# Patient Record
Sex: FEMALE | Race: WHITE | Employment: OTHER | URBAN - METROPOLITAN AREA
[De-identification: names, ages, dates, MRNs, and addresses within clinical notes are randomized per-mention and may not be internally consistent; named-entity substitution may affect disease eponyms.]

---

## 2017-05-12 ENCOUNTER — HOSPITAL ENCOUNTER (OUTPATIENT)
Dept: MAMMOGRAPHY | Age: 67
Discharge: HOME | End: 2017-05-12
Attending: FAMILY MEDICINE | Admitting: FAMILY MEDICINE

## 2017-05-12 DIAGNOSIS — Z12.31 ENCOUNTER FOR SCREENING MAMMOGRAM FOR BREAST CANCER: ICD-10-CM

## 2018-11-13 ENCOUNTER — HOSPITAL ENCOUNTER (OUTPATIENT)
Dept: WOMENS IMAGING | Age: 68
Discharge: HOME | End: 2018-11-13
Payer: COMMERCIAL

## 2018-11-13 DIAGNOSIS — Z12.31 ENCOUNTER FOR SCREENING MAMMOGRAM FOR BREAST CANCER: ICD-10-CM

## 2018-11-13 PROCEDURE — 77067 SCR MAMMO BI INCL CAD: CPT

## 2019-01-10 ENCOUNTER — ANESTHESIA EVENT (OUTPATIENT)
Dept: ENDOSCOPY | Age: 69
End: 2019-01-10
Payer: COMMERCIAL

## 2019-01-23 ENCOUNTER — ANESTHESIA (OUTPATIENT)
Dept: ENDOSCOPY | Age: 69
End: 2019-01-23
Payer: COMMERCIAL

## 2019-01-23 ENCOUNTER — HOSPITAL ENCOUNTER (OUTPATIENT)
Age: 69
Setting detail: OUTPATIENT SURGERY
Discharge: HOME | End: 2019-01-23
Attending: INTERNAL MEDICINE | Admitting: INTERNAL MEDICINE
Payer: COMMERCIAL

## 2019-01-23 VITALS
HEIGHT: 64 IN | DIASTOLIC BLOOD PRESSURE: 74 MMHG | BODY MASS INDEX: 20.83 KG/M2 | OXYGEN SATURATION: 100 % | HEART RATE: 58 BPM | TEMPERATURE: 97.3 F | WEIGHT: 122 LBS | RESPIRATION RATE: 18 BRPM | SYSTOLIC BLOOD PRESSURE: 160 MMHG

## 2019-01-23 VITALS
RESPIRATION RATE: 16 BRPM | OXYGEN SATURATION: 99 % | DIASTOLIC BLOOD PRESSURE: 58 MMHG | SYSTOLIC BLOOD PRESSURE: 105 MMHG

## 2019-01-23 PROCEDURE — 3700000001 HC ADD 15 MINUTES (ANESTHESIA): Performed by: INTERNAL MEDICINE

## 2019-01-23 PROCEDURE — 6360000002 HC RX W HCPCS: Performed by: NURSE ANESTHETIST, CERTIFIED REGISTERED

## 2019-01-23 PROCEDURE — 3609010600 HC COLONOSCOPY POLYPECTOMY SNARE/COLD BIOPSY: Performed by: INTERNAL MEDICINE

## 2019-01-23 PROCEDURE — 7100000011 HC PHASE II RECOVERY - ADDTL 15 MIN: Performed by: INTERNAL MEDICINE

## 2019-01-23 PROCEDURE — 3609012400 HC EGD TRANSORAL BIOPSY SINGLE/MULTIPLE: Performed by: INTERNAL MEDICINE

## 2019-01-23 PROCEDURE — 3700000000 HC ANESTHESIA ATTENDED CARE: Performed by: INTERNAL MEDICINE

## 2019-01-23 PROCEDURE — 88305 TISSUE EXAM BY PATHOLOGIST: CPT

## 2019-01-23 PROCEDURE — C1726 CATH, BAL DIL, NON-VASCULAR: HCPCS | Performed by: INTERNAL MEDICINE

## 2019-01-23 PROCEDURE — 3609012500 HC EGD DILATION BALLOON: Performed by: INTERNAL MEDICINE

## 2019-01-23 PROCEDURE — 2580000003 HC RX 258: Performed by: ANESTHESIOLOGY

## 2019-01-23 PROCEDURE — 2500000003 HC RX 250 WO HCPCS: Performed by: NURSE ANESTHETIST, CERTIFIED REGISTERED

## 2019-01-23 PROCEDURE — 88342 IMHCHEM/IMCYTCHM 1ST ANTB: CPT

## 2019-01-23 PROCEDURE — 7100000010 HC PHASE II RECOVERY - FIRST 15 MIN: Performed by: INTERNAL MEDICINE

## 2019-01-23 PROCEDURE — 2709999900 HC NON-CHARGEABLE SUPPLY: Performed by: INTERNAL MEDICINE

## 2019-01-23 RX ORDER — LIDOCAINE HYDROCHLORIDE 20 MG/ML
INJECTION, SOLUTION INFILTRATION; PERINEURAL PRN
Status: DISCONTINUED | OUTPATIENT
Start: 2019-01-23 | End: 2019-01-23 | Stop reason: SDUPTHER

## 2019-01-23 RX ORDER — PROPOFOL 10 MG/ML
INJECTION, EMULSION INTRAVENOUS PRN
Status: DISCONTINUED | OUTPATIENT
Start: 2019-01-23 | End: 2019-01-23 | Stop reason: SDUPTHER

## 2019-01-23 RX ORDER — SODIUM CHLORIDE 9 MG/ML
INJECTION, SOLUTION INTRAVENOUS CONTINUOUS
Status: DISCONTINUED | OUTPATIENT
Start: 2019-01-23 | End: 2019-01-23 | Stop reason: HOSPADM

## 2019-01-23 RX ORDER — LIDOCAINE HYDROCHLORIDE 10 MG/ML
1 INJECTION, SOLUTION EPIDURAL; INFILTRATION; INTRACAUDAL; PERINEURAL
Status: DISCONTINUED | OUTPATIENT
Start: 2019-01-23 | End: 2019-01-23 | Stop reason: HOSPADM

## 2019-01-23 RX ADMIN — PROPOFOL 20 MG: 10 INJECTION, EMULSION INTRAVENOUS at 09:52

## 2019-01-23 RX ADMIN — SODIUM CHLORIDE: 9 INJECTION, SOLUTION INTRAVENOUS at 09:30

## 2019-01-23 RX ADMIN — LIDOCAINE HYDROCHLORIDE 80 MG: 20 INJECTION, SOLUTION INFILTRATION; PERINEURAL at 09:45

## 2019-01-23 RX ADMIN — PROPOFOL 80 MG: 10 INJECTION, EMULSION INTRAVENOUS at 09:45

## 2019-01-23 RX ADMIN — PROPOFOL 20 MG: 10 INJECTION, EMULSION INTRAVENOUS at 09:50

## 2019-01-23 RX ADMIN — PROPOFOL 20 MG: 10 INJECTION, EMULSION INTRAVENOUS at 09:48

## 2019-01-23 ASSESSMENT — PAIN SCALES - GENERAL
PAINLEVEL_OUTOF10: 0

## 2019-01-23 ASSESSMENT — PAIN - FUNCTIONAL ASSESSMENT: PAIN_FUNCTIONAL_ASSESSMENT: 0-10

## 2019-01-24 LAB
GLUCOSE BLD-MCNC: 107 MG/DL (ref 70–99)
GLUCOSE BLD-MCNC: 93 MG/DL (ref 70–99)
PERFORMED ON: ABNORMAL
PERFORMED ON: NORMAL

## 2019-11-30 ENCOUNTER — ANESTHESIA EVENT (OUTPATIENT)
Dept: ENDOSCOPY | Age: 69
End: 2019-11-30
Payer: COMMERCIAL

## 2019-12-11 ENCOUNTER — HOSPITAL ENCOUNTER (OUTPATIENT)
Age: 69
Setting detail: OUTPATIENT SURGERY
Discharge: HOME | End: 2019-12-11
Attending: INTERNAL MEDICINE | Admitting: INTERNAL MEDICINE
Payer: COMMERCIAL

## 2019-12-11 ENCOUNTER — ANESTHESIA (OUTPATIENT)
Dept: ENDOSCOPY | Age: 69
End: 2019-12-11
Payer: COMMERCIAL

## 2019-12-11 VITALS
SYSTOLIC BLOOD PRESSURE: 135 MMHG | RESPIRATION RATE: 14 BRPM | TEMPERATURE: 97.6 F | OXYGEN SATURATION: 100 % | HEART RATE: 60 BPM | BODY MASS INDEX: 21 KG/M2 | DIASTOLIC BLOOD PRESSURE: 72 MMHG | HEIGHT: 64 IN | WEIGHT: 123 LBS

## 2019-12-11 VITALS — OXYGEN SATURATION: 100 % | DIASTOLIC BLOOD PRESSURE: 53 MMHG | SYSTOLIC BLOOD PRESSURE: 95 MMHG

## 2019-12-11 LAB
GLUCOSE BLD-MCNC: 102 MG/DL (ref 70–99)
GLUCOSE BLD-MCNC: 106 MG/DL (ref 70–99)
PERFORMED ON: ABNORMAL
PERFORMED ON: ABNORMAL

## 2019-12-11 PROCEDURE — 2500000003 HC RX 250 WO HCPCS: Performed by: NURSE ANESTHETIST, CERTIFIED REGISTERED

## 2019-12-11 PROCEDURE — 7100000011 HC PHASE II RECOVERY - ADDTL 15 MIN: Performed by: INTERNAL MEDICINE

## 2019-12-11 PROCEDURE — 2709999900 HC NON-CHARGEABLE SUPPLY: Performed by: INTERNAL MEDICINE

## 2019-12-11 PROCEDURE — 7100000010 HC PHASE II RECOVERY - FIRST 15 MIN: Performed by: INTERNAL MEDICINE

## 2019-12-11 PROCEDURE — 88305 TISSUE EXAM BY PATHOLOGIST: CPT

## 2019-12-11 PROCEDURE — 3700000000 HC ANESTHESIA ATTENDED CARE: Performed by: INTERNAL MEDICINE

## 2019-12-11 PROCEDURE — 3700000001 HC ADD 15 MINUTES (ANESTHESIA): Performed by: INTERNAL MEDICINE

## 2019-12-11 PROCEDURE — 3609010600 HC COLONOSCOPY POLYPECTOMY SNARE/COLD BIOPSY: Performed by: INTERNAL MEDICINE

## 2019-12-11 PROCEDURE — 6360000002 HC RX W HCPCS: Performed by: NURSE ANESTHETIST, CERTIFIED REGISTERED

## 2019-12-11 PROCEDURE — 2580000003 HC RX 258: Performed by: ANESTHESIOLOGY

## 2019-12-11 RX ORDER — LANOLIN ALCOHOL/MO/W.PET/CERES
325 CREAM (GRAM) TOPICAL
COMMUNITY
End: 2020-09-28 | Stop reason: ALTCHOICE

## 2019-12-11 RX ORDER — SODIUM CHLORIDE 0.9 % (FLUSH) 0.9 %
10 SYRINGE (ML) INJECTION EVERY 12 HOURS SCHEDULED
Status: DISCONTINUED | OUTPATIENT
Start: 2019-12-11 | End: 2019-12-11 | Stop reason: HOSPADM

## 2019-12-11 RX ORDER — GLYCOPYRROLATE 0.2 MG/ML
INJECTION INTRAMUSCULAR; INTRAVENOUS PRN
Status: DISCONTINUED | OUTPATIENT
Start: 2019-12-11 | End: 2019-12-11 | Stop reason: SDUPTHER

## 2019-12-11 RX ORDER — LIDOCAINE HYDROCHLORIDE 20 MG/ML
INJECTION, SOLUTION INFILTRATION; PERINEURAL PRN
Status: DISCONTINUED | OUTPATIENT
Start: 2019-12-11 | End: 2019-12-11 | Stop reason: SDUPTHER

## 2019-12-11 RX ORDER — PROPOFOL 10 MG/ML
INJECTION, EMULSION INTRAVENOUS PRN
Status: DISCONTINUED | OUTPATIENT
Start: 2019-12-11 | End: 2019-12-11 | Stop reason: SDUPTHER

## 2019-12-11 RX ORDER — PROPOFOL 10 MG/ML
INJECTION, EMULSION INTRAVENOUS CONTINUOUS PRN
Status: DISCONTINUED | OUTPATIENT
Start: 2019-12-11 | End: 2019-12-11 | Stop reason: SDUPTHER

## 2019-12-11 RX ORDER — SODIUM CHLORIDE 0.9 % (FLUSH) 0.9 %
10 SYRINGE (ML) INJECTION PRN
Status: DISCONTINUED | OUTPATIENT
Start: 2019-12-11 | End: 2019-12-11 | Stop reason: HOSPADM

## 2019-12-11 RX ORDER — SODIUM CHLORIDE 9 MG/ML
INJECTION, SOLUTION INTRAVENOUS CONTINUOUS
Status: DISCONTINUED | OUTPATIENT
Start: 2019-12-11 | End: 2019-12-11 | Stop reason: HOSPADM

## 2019-12-11 RX ADMIN — LIDOCAINE HYDROCHLORIDE 100 MG: 20 INJECTION, SOLUTION INFILTRATION; PERINEURAL at 08:23

## 2019-12-11 RX ADMIN — GLYCOPYRROLATE 0.2 MG: 0.2 INJECTION INTRAMUSCULAR; INTRAVENOUS at 08:31

## 2019-12-11 RX ADMIN — PROPOFOL 30 MG: 10 INJECTION, EMULSION INTRAVENOUS at 08:27

## 2019-12-11 RX ADMIN — GLYCOPYRROLATE 0.2 MG: 0.2 INJECTION INTRAMUSCULAR; INTRAVENOUS at 08:21

## 2019-12-11 RX ADMIN — PROPOFOL 30 MG: 10 INJECTION, EMULSION INTRAVENOUS at 08:31

## 2019-12-11 RX ADMIN — PROPOFOL 50 MCG/KG/MIN: 10 INJECTION, EMULSION INTRAVENOUS at 08:23

## 2019-12-11 RX ADMIN — PROPOFOL 40 MG: 10 INJECTION, EMULSION INTRAVENOUS at 08:23

## 2019-12-11 RX ADMIN — SODIUM CHLORIDE: 9 INJECTION, SOLUTION INTRAVENOUS at 07:46

## 2019-12-11 ASSESSMENT — PAIN - FUNCTIONAL ASSESSMENT: PAIN_FUNCTIONAL_ASSESSMENT: 0-10

## 2019-12-11 ASSESSMENT — PAIN SCALES - GENERAL: PAINLEVEL_OUTOF10: 0

## 2020-02-03 NOTE — PROGRESS NOTES
Patient not reached. Preop instructions left on voice mail. Number__[phone]_______      DATE__2/5/2020______ TIME__0730______ARRIVAL_FEC  0600________      Nothing to eat or drink after midnight the night before,except for what the prep instructions call for. If you do not have the instructions or do not understand them please contact your doctors office. Follow any instructions your doctors office has given you including what medications to take the AM of your procedure and which ones to hold. You may use your inhalers. If you take a long acting insulin the michael prior please cut the dose in half and take no diabetic medications that AM.Follow specific doctors office instructions regarding blood thinners and if they want you to hold and for how long. If you are on a blood thinner and have no instructions please contact the office and ask. Dress comfortably,bring your insurance card,picture ID,and a complete list of medications, including supplements. You must have a responsible adult to stay with you during the procedure,drive you home and stay with you. [facility] phone number [phone] for any questions.

## 2020-02-04 ENCOUNTER — ANESTHESIA EVENT (OUTPATIENT)
Dept: ENDOSCOPY | Age: 70
End: 2020-02-04
Payer: COMMERCIAL

## 2020-02-05 ENCOUNTER — ANESTHESIA (OUTPATIENT)
Dept: ENDOSCOPY | Age: 70
End: 2020-02-05
Payer: COMMERCIAL

## 2020-02-05 ENCOUNTER — HOSPITAL ENCOUNTER (OUTPATIENT)
Age: 70
Setting detail: OUTPATIENT SURGERY
Discharge: HOME | End: 2020-02-05
Attending: INTERNAL MEDICINE | Admitting: INTERNAL MEDICINE
Payer: COMMERCIAL

## 2020-02-05 VITALS
BODY MASS INDEX: 21.17 KG/M2 | OXYGEN SATURATION: 100 % | HEART RATE: 63 BPM | WEIGHT: 124 LBS | SYSTOLIC BLOOD PRESSURE: 131 MMHG | DIASTOLIC BLOOD PRESSURE: 80 MMHG | TEMPERATURE: 97.2 F | RESPIRATION RATE: 18 BRPM | HEIGHT: 64 IN

## 2020-02-05 VITALS — SYSTOLIC BLOOD PRESSURE: 133 MMHG | DIASTOLIC BLOOD PRESSURE: 75 MMHG | OXYGEN SATURATION: 95 %

## 2020-02-05 LAB
GLUCOSE BLD-MCNC: 113 MG/DL (ref 70–99)
PERFORMED ON: ABNORMAL

## 2020-02-05 PROCEDURE — 7100000010 HC PHASE II RECOVERY - FIRST 15 MIN: Performed by: INTERNAL MEDICINE

## 2020-02-05 PROCEDURE — 2500000003 HC RX 250 WO HCPCS: Performed by: NURSE ANESTHETIST, CERTIFIED REGISTERED

## 2020-02-05 PROCEDURE — 3700000000 HC ANESTHESIA ATTENDED CARE: Performed by: INTERNAL MEDICINE

## 2020-02-05 PROCEDURE — 3609019000 HC EGD CAPSULE ENDOSCOPY: Performed by: INTERNAL MEDICINE

## 2020-02-05 PROCEDURE — 7100000000 HC PACU RECOVERY - FIRST 15 MIN: Performed by: INTERNAL MEDICINE

## 2020-02-05 PROCEDURE — 3700000001 HC ADD 15 MINUTES (ANESTHESIA): Performed by: INTERNAL MEDICINE

## 2020-02-05 PROCEDURE — C1726 CATH, BAL DIL, NON-VASCULAR: HCPCS | Performed by: INTERNAL MEDICINE

## 2020-02-05 PROCEDURE — 7100000011 HC PHASE II RECOVERY - ADDTL 15 MIN: Performed by: INTERNAL MEDICINE

## 2020-02-05 PROCEDURE — 2709999900 HC NON-CHARGEABLE SUPPLY: Performed by: INTERNAL MEDICINE

## 2020-02-05 PROCEDURE — 3609017700 HC EGD DILATION GASTRIC/DUODENAL STRICTURE: Performed by: INTERNAL MEDICINE

## 2020-02-05 PROCEDURE — 2580000003 HC RX 258: Performed by: ANESTHESIOLOGY

## 2020-02-05 PROCEDURE — 3609012400 HC EGD TRANSORAL BIOPSY SINGLE/MULTIPLE: Performed by: INTERNAL MEDICINE

## 2020-02-05 PROCEDURE — 6360000002 HC RX W HCPCS: Performed by: NURSE ANESTHETIST, CERTIFIED REGISTERED

## 2020-02-05 PROCEDURE — 2720000010 HC SURG SUPPLY STERILE: Performed by: INTERNAL MEDICINE

## 2020-02-05 PROCEDURE — 7100000001 HC PACU RECOVERY - ADDTL 15 MIN: Performed by: INTERNAL MEDICINE

## 2020-02-05 PROCEDURE — 88305 TISSUE EXAM BY PATHOLOGIST: CPT

## 2020-02-05 RX ORDER — ATORVASTATIN CALCIUM 10 MG/1
10 TABLET, FILM COATED ORAL DAILY
COMMUNITY
End: 2020-09-28 | Stop reason: ALTCHOICE

## 2020-02-05 RX ORDER — PROPOFOL 10 MG/ML
INJECTION, EMULSION INTRAVENOUS PRN
Status: DISCONTINUED | OUTPATIENT
Start: 2020-02-05 | End: 2020-02-05 | Stop reason: SDUPTHER

## 2020-02-05 RX ORDER — SODIUM CHLORIDE 0.9 % (FLUSH) 0.9 %
10 SYRINGE (ML) INJECTION EVERY 12 HOURS SCHEDULED
Status: DISCONTINUED | OUTPATIENT
Start: 2020-02-05 | End: 2020-02-05 | Stop reason: HOSPADM

## 2020-02-05 RX ORDER — SODIUM CHLORIDE 9 MG/ML
INJECTION, SOLUTION INTRAVENOUS CONTINUOUS
Status: DISCONTINUED | OUTPATIENT
Start: 2020-02-05 | End: 2020-02-05 | Stop reason: HOSPADM

## 2020-02-05 RX ORDER — LIDOCAINE HYDROCHLORIDE 20 MG/ML
INJECTION, SOLUTION EPIDURAL; INFILTRATION; INTRACAUDAL; PERINEURAL PRN
Status: DISCONTINUED | OUTPATIENT
Start: 2020-02-05 | End: 2020-02-05 | Stop reason: SDUPTHER

## 2020-02-05 RX ORDER — SODIUM CHLORIDE 0.9 % (FLUSH) 0.9 %
10 SYRINGE (ML) INJECTION PRN
Status: DISCONTINUED | OUTPATIENT
Start: 2020-02-05 | End: 2020-02-05 | Stop reason: HOSPADM

## 2020-02-05 RX ADMIN — LIDOCAINE HYDROCHLORIDE 100 MG: 20 INJECTION, SOLUTION EPIDURAL; INFILTRATION; INTRACAUDAL; PERINEURAL at 07:38

## 2020-02-05 RX ADMIN — PROPOFOL 50 MG: 10 INJECTION, EMULSION INTRAVENOUS at 07:50

## 2020-02-05 RX ADMIN — PROPOFOL 50 MG: 10 INJECTION, EMULSION INTRAVENOUS at 07:44

## 2020-02-05 RX ADMIN — PROPOFOL 50 MG: 10 INJECTION, EMULSION INTRAVENOUS at 07:41

## 2020-02-05 RX ADMIN — PROPOFOL 100 MG: 10 INJECTION, EMULSION INTRAVENOUS at 07:38

## 2020-02-05 RX ADMIN — SODIUM CHLORIDE: 9 INJECTION, SOLUTION INTRAVENOUS at 06:15

## 2020-02-05 RX ADMIN — PROPOFOL 50 MG: 10 INJECTION, EMULSION INTRAVENOUS at 07:47

## 2020-02-05 ASSESSMENT — PAIN SCALES - GENERAL
PAINLEVEL_OUTOF10: 0

## 2020-02-05 ASSESSMENT — PAIN - FUNCTIONAL ASSESSMENT: PAIN_FUNCTIONAL_ASSESSMENT: 0-10

## 2020-02-05 NOTE — ANESTHESIA PRE PROCEDURE
Department of Anesthesiology  Preprocedure Note       Name:  Alejandro Reilly   Age:  77 y.o.  :  1950                                          MRN:  [id number]         Date:  2020      Surgeon: Susan Grady):  Iker Foster MD    Procedure: EGD WITH DILATION / CAPSULE LAUNCH (N/A Abdomen)    Medications prior to admission:   Prior to Admission medications    Medication Sig Start Date End Date Taking? Authorizing Provider   atorvastatin (LIPITOR) 10 MG tablet Take 10 mg by mouth daily    Historical Provider, MD   ferrous sulfate 325 (65 Fe) MG EC tablet Take 325 mg by mouth 3 times daily (with meals)    Historical Provider, MD   Cyanocobalamin (VITAMIN B 12 PO) Take 1,000 mcg by mouth daily    Historical Provider, MD   linaclotide (LINZESS) 290 MCG CAPS capsule Take 1 capsule by mouth every morning (before breakfast) 16   Shannan Jones MD   atenolol-chlorthalidone (TENORETIC 100) 100-25 MG per tablet Take 1 tablet by mouth daily 16  Shannan Jones MD   metFORMIN (GLUCOPHAGE) 500 MG tablet Take 1 tablet by mouth 2 times daily (with meals) 16  Shannan Jones MD   verapamil (CALAN) 120 MG tablet Take 1 tablet by mouth 2 times daily 16   Shannan Jones MD   Amoxicillin 500 MG TABS Take 1 tablet by mouth 3 times daily 16   Shannan Jones MD   lubiprostone (AMITIZA) 24 MCG capsule Take 1 capsule by mouth daily (with breakfast) 16   Shannan Jones MD       Current medications:    No current facility-administered medications for this visit. No current outpatient medications on file.      Facility-Administered Medications Ordered in Other Visits   Medication Dose Route Frequency Provider Last Rate Last Dose    0.9 % sodium chloride infusion   Intravenous Continuous Rai Taveras  mL/hr at 20 0615      sodium chloride flush 0.9 % injection 10 mL  10 mL Intravenous 2 times per day Rai Taveras MD        sodium chloride flush 0.9 % injection 10 mL  10 mL Intravenous PRN Jose Elias Dumont MD           Allergies:  No Known Allergies    Problem List:    Patient Active Problem List   Diagnosis Code    Essential hypertension, malignant I10    Type II or unspecified type diabetes mellitus without mention of complication, not stated as uncontrolled E11.9    Chronic idiopathic constipation K59.04       Past Medical History:        Diagnosis Date    Colon polyps     Constipation     Diabetes mellitus ([facility] [address]     Hyperlipidemia     Hypertension        Past Surgical History:        Procedure Laterality Date     SECTION      COLONOSCOPY  2019    COLONOSCOPY POLYPECTOMY SNARE/COLD BIOPSY performed by Erinn Santos MD at [address] [facility] N/A 2019    COLONOSCOPY POLYPECTOMY SNARE/COLD BIOPSY performed by Erinn Santos MD at [address] N/A 2019    EGD BIOPSY performed by Erinn Santos MD at [address] N/A 2019    EGD DILATION BALLOON performed by Erinn Santos MD at [address] History:    Social History     Tobacco Use    Smoking status: Never Smoker    Smokeless tobacco: Never Used   Substance Use Topics    Alcohol use: Yes     Alcohol/week: 18.0 standard drinks     Types: 12 Standard drinks or equivalent, 6 Cans of beer per week     Comment: weekly                                Counseling given: Not Answered      Vital Signs (Current): There were no vitals filed for this visit.                                            BP Readings from Last 3 Encounters:   20 (!) 159/77   19 (!) 95/53   19 135/72       NPO Status:                                                                                 BMI:   Wt Readings from Last 3 Encounters:   20 124 lb (56.2 kg)   19 123 lb (55.8 kg)   19 122 lb (55.3 kg)     There is no height or weight on file to calculate BMI.    CBC:   Lab Results   Component Value Date    WBC 9.2 01/13/2015    RBC 4.20 01/13/2015    HGB 12.2 01/13/2015    HCT 36.7 01/13/2015    MCV 87.2 01/13/2015    RDW 13.4 01/13/2015     01/13/2015       CMP:   Lab Results   Component Value Date     01/13/2015    K 3.6 01/13/2015    CL 92 01/13/2015    CO2 31 01/13/2015    BUN 10 01/13/2015    GFRAA > 60 01/13/2015    AGRATIO 1.1 01/13/2015    LABGLOM > 60 01/13/2015    GLUCOSE 114 01/13/2015    CALCIUM 9.2 01/13/2015    BILITOT 0.3 01/13/2015    ALKPHOS 82 01/13/2015    AST 11 01/13/2015    ALT 21 01/13/2015       POC Tests:   Recent Labs     02/05/20  9633   POCGLU 113*       Coags: No results found for: PROTIME, INR, APTT    HCG (If Applicable): No results found for: PREGTESTUR, PREGSERUM, HCG, HCGQUANT     ABGs: No results found for: PHART, PO2ART, BVO4JOS, NHQ8LUZ, BEART, D3WZPXDL     Type & Screen (If Applicable):  No results found for: LABABO, [address]    Anesthesia Evaluation  Patient summary reviewed and Nursing notes reviewed  Airway: Mallampati: II  TM distance: >3 FB   Neck ROM: full  Mouth opening: > = 3 FB Dental:          Pulmonary:normal exam                               Cardiovascular:  Exercise tolerance: good (>4 METS),   (+) hypertension:, hyperlipidemia      ECG reviewed  Rhythm: regular  Rate: normal                    Neuro/Psych:               GI/Hepatic/Renal:             Endo/Other:    (+) Diabetes, electrolyte abnormalities, . Abdominal:           Vascular:                                          Anesthesia Plan      MAC     ASA 3       Induction: intravenous. Anesthetic plan and risks discussed with patient. Plan discussed with CRNA.             MEDICATIONS:  Current Facility-Administered Medications   Medication Dose Route Frequency Provider Last Rate Last Dose    0.9 % sodium chloride infusion   Intravenous Continuous Nolan Gray  mL/hr at 02/05/20 0615      sodium chloride flush 0.9 % injection 10 mL  10 mL Intravenous 2 times per day Alphonse Walsh MD        sodium chloride flush 0.9 % injection 10 mL  10 mL Intravenous PRN Alphonse Walsh MD            LABS:  Lab Results   Component Value Date    WBC 9.2 01/13/2015    HGB 12.2 01/13/2015    HCT 36.7 01/13/2015    MCV 87.2 01/13/2015     01/13/2015    GLUCOSE 114 (A) 01/13/2015       Lab Results   Component Value Date     (A) 01/13/2015    K 3.6 01/13/2015    CL 92 (A) 01/13/2015    CO2 31 01/13/2015    BUN 10 01/13/2015       Problem List:  Patient Active Problem List   Diagnosis    Essential hypertension, malignant    Type II or unspecified type diabetes mellitus without mention of complication, not stated as uncontrolled    Chronic idiopathic constipation       Yajaira Alatorre MD   2/5/2020

## 2020-02-05 NOTE — H&P
daily (with breakfast) 30 capsule 3    Amoxicillin 500 MG TABS Take 1 tablet by mouth 3 times daily 21 tablet 0        Allergies:  Patient has no known allergies. Social History:   Social History     Tobacco Use    Smoking status: Never Smoker    Smokeless tobacco: Never Used   Substance Use Topics    Alcohol use: Yes     Alcohol/week: 18.0 standard drinks     Types: 12 Standard drinks or equivalent, 6 Cans of beer per week     Comment: weekly     Family History:   Family History   Problem Relation Age of Onset    Cancer Mother     Asthma Father     Heart Attack Brother        PHYSICAL EXAM:      BP (!) 159/77   Pulse 60   Temp 97.3 °F (36.3 °C) (Temporal)   Resp 16   Ht 5' 4\" (1.626 m)   Wt 124 lb (56.2 kg)   SpO2 100%   BMI 21.28 kg/m²  I        Heart:  RRR, normal s1s2    Lungs:  CTA and normal effort    Abdomen:   Soft, nt nd. ASSESSMENT AND PLAN:    1. Patient is a 77 y.o. female here for endoscopy with MAC sedation. 2.  Procedure options, risks and benefits reviewed with patient and/or guardian. They express understanding.

## 2020-02-05 NOTE — ANESTHESIA POSTPROCEDURE EVALUATION
Department of Anesthesiology  Postprocedure Note    Patient: Benji Garcia  MRN: [id number]  YOB: 1950  Date of evaluation: 2/5/2020  Time:  11:52 AM     Procedure Summary     Date:  02/05/20 Room / Location:  [address] [place] [address]    Anesthesia Start:  6409 Anesthesia Stop:  0801    Procedures:       EGD WITH DILATION / CAPSULE LAUNCH (N/A Abdomen)      EGD BIOPSY (N/A Abdomen) Diagnosis:  (DYSPHAGIA R13.10)    Surgeon:  Rut Powers MD Responsible Provider:  Gaye Talley MD    Anesthesia Type:  MAC ASA Status:  3          Anesthesia Type: MAC    Vito Phase I: Vito Score: 10    Vito Phase II: Vito Score: 10    Last vitals: Reviewed and per EMR flowsheets.        Anesthesia Post Evaluation    Patient location during evaluation: PACU  Complications: no  Cardiovascular status: hemodynamically stable  Respiratory status: acceptable

## 2020-08-23 ENCOUNTER — APPOINTMENT (OUTPATIENT)
Dept: CT IMAGING | Age: 70
DRG: 552 | End: 2020-08-23
Payer: COMMERCIAL

## 2020-08-23 ENCOUNTER — HOSPITAL ENCOUNTER (INPATIENT)
Age: 70
LOS: 4 days | Discharge: NURSING, REHAB OR LONG-TERM CARE | DRG: 552 | End: 2020-08-27
Attending: EMERGENCY MEDICINE | Admitting: INTERNAL MEDICINE
Payer: COMMERCIAL

## 2020-08-23 PROBLEM — S22.089A CLOSED T12 FRACTURE (HCC): Status: ACTIVE | Noted: 2020-08-23

## 2020-08-23 PROBLEM — E87.1 HYPONATREMIA: Status: ACTIVE | Noted: 2020-08-23

## 2020-08-23 LAB
A/G RATIO: 1.6 (ref 1.1–2.2)
ALBUMIN SERPL-MCNC: 4.6 G/DL (ref 3.4–5)
ALP BLD-CCNC: 78 U/L (ref 40–129)
ALT SERPL-CCNC: 19 U/L (ref 10–40)
ANION GAP SERPL CALCULATED.3IONS-SCNC: 15 MMOL/L (ref 3–16)
AST SERPL-CCNC: 21 U/L (ref 15–37)
BACTERIA: ABNORMAL /HPF
BASOPHILS ABSOLUTE: 0 K/UL (ref 0–0.2)
BASOPHILS RELATIVE PERCENT: 0.4 %
BILIRUB SERPL-MCNC: 1 MG/DL (ref 0–1)
BILIRUBIN URINE: NEGATIVE
BLOOD, URINE: ABNORMAL
BUN BLDV-MCNC: 9 MG/DL (ref 7–20)
CALCIUM SERPL-MCNC: 9.6 MG/DL (ref 8.3–10.6)
CHLORIDE BLD-SCNC: 81 MMOL/L (ref 99–110)
CLARITY: ABNORMAL
CO2: 25 MMOL/L (ref 21–32)
COLOR: YELLOW
CREAT SERPL-MCNC: <0.5 MG/DL (ref 0.6–1.2)
EOSINOPHILS ABSOLUTE: 0.1 K/UL (ref 0–0.6)
EOSINOPHILS RELATIVE PERCENT: 0.9 %
EPITHELIAL CELLS, UA: 1 /HPF (ref 0–5)
GFR AFRICAN AMERICAN: >60
GFR NON-AFRICAN AMERICAN: >60
GLOBULIN: 2.9 G/DL
GLUCOSE BLD-MCNC: 113 MG/DL (ref 70–99)
GLUCOSE BLD-MCNC: 128 MG/DL (ref 70–99)
GLUCOSE URINE: NEGATIVE MG/DL
HCT VFR BLD CALC: 36.6 % (ref 36–48)
HEMOGLOBIN: 13.2 G/DL (ref 12–16)
HYALINE CASTS: 0 /LPF (ref 0–8)
INR BLD: 1.03 (ref 0.86–1.14)
KETONES, URINE: NEGATIVE MG/DL
LEUKOCYTE ESTERASE, URINE: NEGATIVE
LIPASE: 16 U/L (ref 13–60)
LYMPHOCYTES ABSOLUTE: 1.1 K/UL (ref 1–5.1)
LYMPHOCYTES RELATIVE PERCENT: 11.1 %
MAGNESIUM: 1.4 MG/DL (ref 1.8–2.4)
MCH RBC QN AUTO: 30.8 PG (ref 26–34)
MCHC RBC AUTO-ENTMCNC: 35.9 G/DL (ref 31–36)
MCV RBC AUTO: 85.7 FL (ref 80–100)
MICROSCOPIC EXAMINATION: YES
MONOCYTES ABSOLUTE: 0.8 K/UL (ref 0–1.3)
MONOCYTES RELATIVE PERCENT: 7.8 %
NEUTROPHILS ABSOLUTE: 8 K/UL (ref 1.7–7.7)
NEUTROPHILS RELATIVE PERCENT: 79.8 %
NITRITE, URINE: NEGATIVE
PDW BLD-RTO: 13.3 % (ref 12.4–15.4)
PERFORMED ON: ABNORMAL
PH UA: 6 (ref 5–8)
PLATELET # BLD: 270 K/UL (ref 135–450)
PMV BLD AUTO: 7.1 FL (ref 5–10.5)
POTASSIUM REFLEX MAGNESIUM: 3.1 MMOL/L (ref 3.5–5.1)
PROTEIN UA: 30 MG/DL
PROTHROMBIN TIME: 11.9 SEC (ref 10–13.2)
RBC # BLD: 4.27 M/UL (ref 4–5.2)
RBC UA: 1 /HPF (ref 0–4)
SODIUM BLD-SCNC: 121 MMOL/L (ref 136–145)
SPECIFIC GRAVITY UA: 1.01 (ref 1–1.03)
TOTAL PROTEIN: 7.5 G/DL (ref 6.4–8.2)
URINE REFLEX TO CULTURE: ABNORMAL
URINE TYPE: ABNORMAL
UROBILINOGEN, URINE: 0.2 E.U./DL
WBC # BLD: 10 K/UL (ref 4–11)
WBC UA: 2 /HPF (ref 0–5)

## 2020-08-23 PROCEDURE — 80053 COMPREHEN METABOLIC PANEL: CPT

## 2020-08-23 PROCEDURE — 93005 ELECTROCARDIOGRAM TRACING: CPT | Performed by: EMERGENCY MEDICINE

## 2020-08-23 PROCEDURE — 72131 CT LUMBAR SPINE W/O DYE: CPT

## 2020-08-23 PROCEDURE — 1200000000 HC SEMI PRIVATE

## 2020-08-23 PROCEDURE — 94761 N-INVAS EAR/PLS OXIMETRY MLT: CPT

## 2020-08-23 PROCEDURE — 6370000000 HC RX 637 (ALT 250 FOR IP): Performed by: INTERNAL MEDICINE

## 2020-08-23 PROCEDURE — 2580000003 HC RX 258: Performed by: INTERNAL MEDICINE

## 2020-08-23 PROCEDURE — 85610 PROTHROMBIN TIME: CPT

## 2020-08-23 PROCEDURE — 85025 COMPLETE CBC W/AUTO DIFF WBC: CPT

## 2020-08-23 PROCEDURE — 72128 CT CHEST SPINE W/O DYE: CPT

## 2020-08-23 PROCEDURE — 83690 ASSAY OF LIPASE: CPT

## 2020-08-23 PROCEDURE — G0378 HOSPITAL OBSERVATION PER HR: HCPCS

## 2020-08-23 PROCEDURE — 6360000002 HC RX W HCPCS: Performed by: PHYSICIAN ASSISTANT

## 2020-08-23 PROCEDURE — 96366 THER/PROPH/DIAG IV INF ADDON: CPT

## 2020-08-23 PROCEDURE — 99285 EMERGENCY DEPT VISIT HI MDM: CPT

## 2020-08-23 PROCEDURE — 6370000000 HC RX 637 (ALT 250 FOR IP): Performed by: PHYSICIAN ASSISTANT

## 2020-08-23 PROCEDURE — 74177 CT ABD & PELVIS W/CONTRAST: CPT

## 2020-08-23 PROCEDURE — 96365 THER/PROPH/DIAG IV INF INIT: CPT

## 2020-08-23 PROCEDURE — 83735 ASSAY OF MAGNESIUM: CPT

## 2020-08-23 PROCEDURE — 81001 URINALYSIS AUTO W/SCOPE: CPT

## 2020-08-23 PROCEDURE — 6360000004 HC RX CONTRAST MEDICATION: Performed by: PHYSICIAN ASSISTANT

## 2020-08-23 PROCEDURE — 96361 HYDRATE IV INFUSION ADD-ON: CPT

## 2020-08-23 PROCEDURE — 96375 TX/PRO/DX INJ NEW DRUG ADDON: CPT

## 2020-08-23 PROCEDURE — 94760 N-INVAS EAR/PLS OXIMETRY 1: CPT

## 2020-08-23 PROCEDURE — 2580000003 HC RX 258: Performed by: PHYSICIAN ASSISTANT

## 2020-08-23 RX ORDER — ACETAMINOPHEN 325 MG/1
650 TABLET ORAL EVERY 6 HOURS PRN
Status: DISCONTINUED | OUTPATIENT
Start: 2020-08-23 | End: 2020-08-27 | Stop reason: HOSPADM

## 2020-08-23 RX ORDER — MAGNESIUM SULFATE IN WATER 40 MG/ML
2 INJECTION, SOLUTION INTRAVENOUS ONCE
Status: COMPLETED | OUTPATIENT
Start: 2020-08-23 | End: 2020-08-23

## 2020-08-23 RX ORDER — POTASSIUM CHLORIDE 20 MEQ/1
40 TABLET, EXTENDED RELEASE ORAL PRN
Status: DISCONTINUED | OUTPATIENT
Start: 2020-08-23 | End: 2020-08-27 | Stop reason: HOSPADM

## 2020-08-23 RX ORDER — HYDRALAZINE HYDROCHLORIDE 20 MG/ML
10 INJECTION INTRAMUSCULAR; INTRAVENOUS EVERY 6 HOURS PRN
Status: DISCONTINUED | OUTPATIENT
Start: 2020-08-23 | End: 2020-08-27 | Stop reason: HOSPADM

## 2020-08-23 RX ORDER — SODIUM CHLORIDE 0.9 % (FLUSH) 0.9 %
10 SYRINGE (ML) INJECTION EVERY 12 HOURS SCHEDULED
Status: DISCONTINUED | OUTPATIENT
Start: 2020-08-23 | End: 2020-08-27 | Stop reason: HOSPADM

## 2020-08-23 RX ORDER — ATENOLOL 50 MG/1
100 TABLET ORAL DAILY
Status: DISCONTINUED | OUTPATIENT
Start: 2020-08-24 | End: 2020-08-27 | Stop reason: HOSPADM

## 2020-08-23 RX ORDER — SODIUM CHLORIDE 0.9 % (FLUSH) 0.9 %
10 SYRINGE (ML) INJECTION PRN
Status: DISCONTINUED | OUTPATIENT
Start: 2020-08-23 | End: 2020-08-27 | Stop reason: HOSPADM

## 2020-08-23 RX ORDER — FERROUS SULFATE 325(65) MG
325 TABLET ORAL
Status: DISCONTINUED | OUTPATIENT
Start: 2020-08-24 | End: 2020-08-27 | Stop reason: HOSPADM

## 2020-08-23 RX ORDER — VERAPAMIL HYDROCHLORIDE 80 MG/1
120 TABLET ORAL 2 TIMES DAILY
Status: DISCONTINUED | OUTPATIENT
Start: 2020-08-23 | End: 2020-08-27 | Stop reason: HOSPADM

## 2020-08-23 RX ORDER — POTASSIUM CHLORIDE 20 MEQ/1
40 TABLET, EXTENDED RELEASE ORAL PRN
Status: DISCONTINUED | OUTPATIENT
Start: 2020-08-23 | End: 2020-08-23 | Stop reason: SDUPTHER

## 2020-08-23 RX ORDER — MORPHINE SULFATE 2 MG/ML
2 INJECTION, SOLUTION INTRAMUSCULAR; INTRAVENOUS ONCE
Status: COMPLETED | OUTPATIENT
Start: 2020-08-23 | End: 2020-08-23

## 2020-08-23 RX ORDER — FAMOTIDINE 20 MG/1
20 TABLET, FILM COATED ORAL 2 TIMES DAILY PRN
Status: DISCONTINUED | OUTPATIENT
Start: 2020-08-23 | End: 2020-08-27 | Stop reason: HOSPADM

## 2020-08-23 RX ORDER — SODIUM CHLORIDE 9 MG/ML
INJECTION, SOLUTION INTRAVENOUS CONTINUOUS
Status: DISCONTINUED | OUTPATIENT
Start: 2020-08-23 | End: 2020-08-27 | Stop reason: HOSPADM

## 2020-08-23 RX ORDER — CYCLOBENZAPRINE HCL 10 MG
10 TABLET ORAL 3 TIMES DAILY PRN
Status: DISCONTINUED | OUTPATIENT
Start: 2020-08-23 | End: 2020-08-27 | Stop reason: HOSPADM

## 2020-08-23 RX ORDER — POTASSIUM CHLORIDE 7.45 MG/ML
10 INJECTION INTRAVENOUS PRN
Status: DISCONTINUED | OUTPATIENT
Start: 2020-08-23 | End: 2020-08-23 | Stop reason: SDUPTHER

## 2020-08-23 RX ORDER — PROMETHAZINE HYDROCHLORIDE 25 MG/1
12.5 TABLET ORAL EVERY 6 HOURS PRN
Status: DISCONTINUED | OUTPATIENT
Start: 2020-08-23 | End: 2020-08-27 | Stop reason: HOSPADM

## 2020-08-23 RX ORDER — 0.9 % SODIUM CHLORIDE 0.9 %
1000 INTRAVENOUS SOLUTION INTRAVENOUS ONCE
Status: COMPLETED | OUTPATIENT
Start: 2020-08-23 | End: 2020-08-23

## 2020-08-23 RX ORDER — 0.9 % SODIUM CHLORIDE 0.9 %
500 INTRAVENOUS SOLUTION INTRAVENOUS PRN
Status: DISCONTINUED | OUTPATIENT
Start: 2020-08-23 | End: 2020-08-27 | Stop reason: HOSPADM

## 2020-08-23 RX ORDER — ONDANSETRON 2 MG/ML
4 INJECTION INTRAMUSCULAR; INTRAVENOUS EVERY 6 HOURS PRN
Status: DISCONTINUED | OUTPATIENT
Start: 2020-08-23 | End: 2020-08-27 | Stop reason: HOSPADM

## 2020-08-23 RX ORDER — ACETAMINOPHEN 650 MG/1
650 SUPPOSITORY RECTAL EVERY 6 HOURS PRN
Status: DISCONTINUED | OUTPATIENT
Start: 2020-08-23 | End: 2020-08-27 | Stop reason: HOSPADM

## 2020-08-23 RX ORDER — POTASSIUM CHLORIDE 7.45 MG/ML
10 INJECTION INTRAVENOUS PRN
Status: DISCONTINUED | OUTPATIENT
Start: 2020-08-23 | End: 2020-08-27 | Stop reason: HOSPADM

## 2020-08-23 RX ORDER — ATORVASTATIN CALCIUM 10 MG/1
10 TABLET, FILM COATED ORAL NIGHTLY
Status: DISCONTINUED | OUTPATIENT
Start: 2020-08-23 | End: 2020-08-27 | Stop reason: HOSPADM

## 2020-08-23 RX ORDER — HYDROCODONE BITARTRATE AND ACETAMINOPHEN 5; 325 MG/1; MG/1
1 TABLET ORAL EVERY 6 HOURS PRN
Status: DISCONTINUED | OUTPATIENT
Start: 2020-08-23 | End: 2020-08-25

## 2020-08-23 RX ADMIN — HYDROCODONE BITARTRATE AND ACETAMINOPHEN 1 TABLET: 5; 325 TABLET ORAL at 23:42

## 2020-08-23 RX ADMIN — VERAPAMIL HYDROCHLORIDE 120 MG: 80 TABLET, FILM COATED ORAL at 23:25

## 2020-08-23 RX ADMIN — IOPAMIDOL 75 ML: 755 INJECTION, SOLUTION INTRAVENOUS at 19:30

## 2020-08-23 RX ADMIN — SODIUM CHLORIDE 1000 ML: 9 INJECTION, SOLUTION INTRAVENOUS at 17:37

## 2020-08-23 RX ADMIN — MAGNESIUM SULFATE HEPTAHYDRATE 2 G: 40 INJECTION, SOLUTION INTRAVENOUS at 18:47

## 2020-08-23 RX ADMIN — Medication 10 ML: at 23:25

## 2020-08-23 RX ADMIN — SODIUM CHLORIDE: 9 INJECTION, SOLUTION INTRAVENOUS at 23:25

## 2020-08-23 RX ADMIN — POTASSIUM BICARBONATE 40 MEQ: 782 TABLET, EFFERVESCENT ORAL at 18:51

## 2020-08-23 RX ADMIN — MORPHINE SULFATE 2 MG: 2 INJECTION, SOLUTION INTRAMUSCULAR; INTRAVENOUS at 17:37

## 2020-08-23 RX ADMIN — CYCLOBENZAPRINE 10 MG: 10 TABLET, FILM COATED ORAL at 23:42

## 2020-08-23 ASSESSMENT — PAIN DESCRIPTION - FREQUENCY: FREQUENCY: CONTINUOUS

## 2020-08-23 ASSESSMENT — PAIN DESCRIPTION - PROGRESSION
CLINICAL_PROGRESSION: GRADUALLY IMPROVING
CLINICAL_PROGRESSION: NOT CHANGED

## 2020-08-23 ASSESSMENT — PAIN SCALES - GENERAL
PAINLEVEL_OUTOF10: 10
PAINLEVEL_OUTOF10: 10
PAINLEVEL_OUTOF10: 7
PAINLEVEL_OUTOF10: 10
PAINLEVEL_OUTOF10: 10

## 2020-08-23 ASSESSMENT — PAIN DESCRIPTION - LOCATION: LOCATION: BACK

## 2020-08-23 ASSESSMENT — PAIN DESCRIPTION - DESCRIPTORS: DESCRIPTORS: OTHER (COMMENT)

## 2020-08-23 ASSESSMENT — PAIN DESCRIPTION - ONSET: ONSET: ON-GOING

## 2020-08-23 ASSESSMENT — PAIN DESCRIPTION - PAIN TYPE
TYPE: ACUTE PAIN
TYPE: ACUTE PAIN

## 2020-08-23 ASSESSMENT — PAIN - FUNCTIONAL ASSESSMENT: PAIN_FUNCTIONAL_ASSESSMENT: PREVENTS OR INTERFERES WITH MANY ACTIVE NOT PASSIVE ACTIVITIES

## 2020-08-23 ASSESSMENT — PAIN DESCRIPTION - ORIENTATION: ORIENTATION: LOWER

## 2020-08-23 NOTE — ED NOTES
Bed: 27  Expected date:   Expected time:   Means of arrival:   Comments:  kash Cisneros RN  08/23/20 6757

## 2020-08-23 NOTE — ED NOTES
Pt assisted to bedside commode by writer. Updated on plan of care. Medicated per orders. Patient brought warm blanket at patient's request. Family at bedside. Patient resting comfortably with no signs of distress. Denies any needs at this time. Bed locked and in lowest position with both side rails raised. Call light within reach.      Dickson Schirmer, RN  08/23/20 5810

## 2020-08-23 NOTE — ED NOTES
Iv inserted. Blood collected. Pt medicated per orders. Updated on plan of care. Patient resting comfortably with no signs of distress. Denies any needs at this time. Bed locked and in lowest position with both side rails raised. Call light within reach. Patient brought warm blanket at patient's request. Family at bedside.      Candice Marcelo RN  08/23/20 2192

## 2020-08-23 NOTE — ED PROVIDER NOTES
Usama Stephens        Pt Name: Romeo Guthrie  MRN: [id number]  [place] 1950  Date of evaluation: 8/23/2020  Provider: Elizabeth Witt PA-C  PCP: Hilaria Tinajero MD     I have seen and evaluated this patient with my supervising physician Renetta Don MD.    CHIEF COMPLAINT       Chief Complaint   Patient presents with    Fall     Pt states fell Friday when going to bathroom, pt states shes been having episoded of dizziness and seen PCP but no answers, thinks could be b/p meds, pt states this made her fall, pt with back pain       HISTORY OF PRESENT ILLNESS   (Location, Timing/Onset, Context/Setting, Quality, Duration, Modifying Factors, Severity, Associated Signs and Symptoms)  Note limiting factors. Romeo Guthrie is a 72 y.o. female resenting with friend. Patient reports Saturday 2 AM in the morning she needed to use the restroom. She will, sat on the side of the bed and stood up to walk to the bathroom. Lights are out. It is totally dark. States she took a few steps felt lightheaded as sometimes occurs and she fell back landing on her buttock. She was stunned and the wind was knocked out of her. After about 10 minutes she was able to stand and go to the bathroom. Since the fall she has had increasing back pain and abdominal pain. She has no radicular components. No bowel or bladder dysfunction. No saddle anesthesia. She comes out for evaluation of back pain and abdominal pain. Nursing Notes were all reviewed and agreed with or any disagreements were addressed in the HPI. REVIEW OF SYSTEMS    (2-9 systems for level 4, 10 or more for level 5)     Review of Systems    Positives and Pertinent negatives as per HPI. Except as noted above in the ROS, all other systems were reviewed and negative.        PAST MEDICAL HISTORY     Past Medical History:   Diagnosis Date    Colon polyps     Constipation     Diabetes mellitus ([facility] [address]     Hyperlipidemia     Hypertension          SURGICAL HISTORY     Past Surgical History:   Procedure Laterality Date     SECTION      COLONOSCOPY  2019    COLONOSCOPY POLYPECTOMY SNARE/COLD BIOPSY performed by Melida Gaucher, MD at [place] [address] N/A 2019    COLONOSCOPY POLYPECTOMY SNARE/COLD BIOPSY performed by Melida Gaucher, MD at [address] [place] [address] N/A 2019    EGD BIOPSY performed by Melida Gaucher, MD at [address] [place] [address] N/A 2019    EGD DILATION BALLOON performed by Melida Gaucher, MD at [address] [place] [address] N/A 2020    EGD WITH BALLOON  DILATION performed by Melida Gaucher, MD at [address] [place] [address] N/A 2020    EGD BIOPSY performed by Melida Gaucher, MD at [address] [place] [address]  2020    ESOPHAGEAL CAPSULE ENDOSCOPY performed by Melida Gaucher, MD at [address]       Current Discharge Medication List      CONTINUE these medications which have NOT CHANGED    Details   atorvastatin (LIPITOR) 10 MG tablet Take 10 mg by mouth daily      ferrous sulfate 325 (65 Fe) MG EC tablet Take 325 mg by mouth 3 times daily (with meals)      Cyanocobalamin (VITAMIN B 12 PO) Take 1,000 mcg by mouth daily      linaclotide (LINZESS) 290 MCG CAPS capsule Take 1 capsule by mouth every morning (before breakfast)  Qty: 30 capsule, Refills: 2      atenolol-chlorthalidone (TENORETIC 100) 100-25 MG per tablet Take 1 tablet by mouth daily  Qty: 30 tablet, Refills: 4      metFORMIN (GLUCOPHAGE) 500 MG tablet Take 1 tablet by mouth 2 times daily (with meals)  Qty: 60 tablet, Refills: 4      verapamil (CALAN) 120 MG tablet Take 1 tablet by mouth 2 times daily  Qty: 60 tablet, Refills: 4      Amoxicillin 500 MG TABS Take 1 tablet by mouth 3 times daily  Qty: 21 tablet, Refills: 0      lubiprostone (AMITIZA) 24 MCG capsule Take 1 capsule by mouth daily (with breakfast)  Qty: 30 capsule, Refills: 3               ALLERGIES     Patient has no known allergies. FAMILYHISTORY       Family History   Problem Relation Age of Onset    Cancer Mother     Asthma Father     Heart Attack Brother           SOCIAL HISTORY       Social History     Tobacco Use    Smoking status: Never Smoker    Smokeless tobacco: Never Used   Substance Use Topics    Alcohol use: Yes     Alcohol/week: 18.0 standard drinks     Types: 12 Standard drinks or equivalent, 6 Cans of beer per week     Comment: weekly    Drug use: No       SCREENINGS    [place] Coma Scale  Eye Opening: Spontaneous  Best Verbal Response: Oriented  Best Motor Response: Obeys commands  [place] Coma Scale Score: 15        PHYSICAL EXAM    (up to 7 for level 4, 8 or more for level 5)     ED Triage Vitals [08/23/20 1528]   BP Temp Temp Source Pulse Resp SpO2 Height Weight   126/78 98.2 °F (36.8 °C) Oral 68 18 96 % 5' 4\" (1.626 m) 124 lb (56.2 kg)       Physical Exam  Vitals signs and nursing note reviewed. Constitutional:       Appearance: Normal appearance. She is well-developed and normal weight. HENT:      Head: Normocephalic and atraumatic. Right Ear: External ear normal.      Left Ear: External ear normal.   Eyes:      General: No scleral icterus. Right eye: No discharge. Left eye: No discharge. Conjunctiva/sclera: Conjunctivae normal.   Neck:      Musculoskeletal: Normal range of motion and neck supple. Cardiovascular:      Rate and Rhythm: Normal rate and regular rhythm. Heart sounds: Normal heart sounds. Pulmonary:      Effort: Pulmonary effort is normal.      Breath sounds: Normal breath sounds. Abdominal:      General: Abdomen is flat. Bowel sounds are normal. There is no distension. Palpations: Abdomen is soft. Tenderness: There is abdominal tenderness.  There is no guarding or rebound. Comments: Mild diffuse generalized abdominal tenderness noted. Musculoskeletal: Normal range of motion. General: Tenderness present. Comments: Patient with known diffuse tenderness along the lower thoracic and LS spine. Patient has equal strength lower extremity. Neurosensory intact. Sensation from hip to toes. Skin:     General: Skin is warm and dry. Neurological:      General: No focal deficit present. Mental Status: She is alert and oriented to person, place, and time. Mental status is at baseline. Psychiatric:         Mood and Affect: Mood normal.         Behavior: Behavior normal.         Thought Content:  Thought content normal.         Judgment: Judgment normal.         DIAGNOSTIC RESULTS   LABS:    Labs Reviewed   CBC WITH AUTO DIFFERENTIAL - Abnormal; Notable for the following components:       Result Value    Neutrophils Absolute 8.0 (*)     All other components within normal limits    Narrative:     Performed at:  [facility] [address], [place], [address] [place]   Phone [phone]   COMPREHENSIVE METABOLIC PANEL W/ REFLEX TO MG FOR LOW K - Abnormal; Notable for the following components:    Sodium 121 (*)     Potassium reflex Magnesium 3.1 (*)     Chloride 81 (*)     Glucose 113 (*)     CREATININE <0.5 (*)     All other components within normal limits    Narrative:     Performed at:  [facility] [address], [place], [address]   Phone [phone]   URINE RT REFLEX TO CULTURE - Abnormal; Notable for the following components:    Clarity, UA CLOUDY (*)     Blood, Urine TRACE (*)     Protein, UA 30 (*)     All other components within normal limits    Narrative:     Performed at:  [facility] [address], [place], [facility] [place]   Phone [phone]   MICROSCOPIC URINALYSIS - Abnormal; Notable for the following components:    Bacteria, UA 4+ (*)     All other components within normal limits    Narrative:     Performed at:  [facility] [address], [place]   Phone [phone]   MAGNESIUM - Abnormal; Notable for the following components:    Magnesium 1.40 (*)     All other components within normal limits    Narrative:     Performed at:  [facility] [address], [place]   Phone [phone]   COMPREHENSIVE METABOLIC PANEL W/ REFLEX TO MG FOR LOW K - Abnormal; Notable for the following components:    Sodium 128 (*)     Potassium reflex Magnesium 3.4 (*)     Chloride 89 (*)     Glucose 111 (*)     CREATININE <0.5 (*)     AST 14 (*)     All other components within normal limits    Narrative:     Performed at:  [facility] [address], [place]   Phone [phone]   CBC WITH AUTO DIFFERENTIAL - Abnormal; Notable for the following components:    Hematocrit 34.8 (*)     All other components within normal limits    Narrative:     Performed at:  [facility] [address], [place]   Phone [phone]   BASIC METABOLIC PANEL - Abnormal; Notable for the following components:    Potassium 3.4 (*)     All other components within normal limits    Narrative:     Performed at:  [facility] [address], [place]   Phone [phone]   POCT GLUCOSE - Abnormal; Notable for the following components:    POC Glucose 128 (*)     All other components within normal limits    Narrative:     Performed at:  [facility] [address], [place]   Phone [phone]   LIPASE    Narrative:     Performed at:  [facility] [address], [place]   Phone [phone]   PROTIME-INR    Narrative:     Performed at:  [facility]  [address], [place]   Phone [phone]   MAGNESIUM    Narrative:     Performed at:  [facility]  [address], [address]   Phone [phone]       All other labs were within normal range or not returned as of this dictation. EKG: All EKG's are interpreted by the Emergency Department Physician in the absence of a cardiologist.  Please see their note for interpretation of EKG. RADIOLOGY:   Non-plain film images such as CT, Ultrasound and MRI are read by the radiologist. Plain radiographic images are visualized and preliminarily interpreted by the ED Provider with the below findings:        Interpretation per the Radiologist below, if available at the time of this note:    CT ABDOMEN PELVIS W IV CONTRAST Additional Contrast? None   Final Result   No acute intra-abdominal or intrapelvic abnormality noted. CT thoracic spine reported separately. CT lumbar spine demonstrates superior endplate fracture at E12 with minimal   loss of height and retropulsion. No significant narrowing of the neural   canal noted. Subtle findings are limited by underlying osteopenia. CT LUMBAR SPINE WO CONTRAST   Final Result   No acute intra-abdominal or intrapelvic abnormality noted. CT thoracic spine reported separately. CT lumbar spine demonstrates superior endplate fracture at M45 with minimal   loss of height and retropulsion. No significant narrowing of the neural   canal noted. Subtle findings are limited by underlying osteopenia. CT THORACIC SPINE WO CONTRAST   Final Result   Acute burst type fracture of T12, resulting in mild loss of vertebral body   height. No results found.         PROCEDURES   Unless otherwise noted below, none     Procedures    CRITICAL CARE TIME   N/A    CONSULTS:  IP CONSULT TO INTERNAL MEDICINE  IP CONSULT TO SOCIAL WORK      EMERGENCY DEPARTMENT COURSE and DIFFERENTIAL DIAGNOSIS/MDM:   Vitals:    Vitals:    08/24/20 0351 08/24/20 0556 08/24/20 0808 08/24/20 1130   BP:   (!) 146/70 101/64   Pulse: 54 57 56 52   Resp:   16 16   Temp:   98.6 °F (37 °C) 98 °F (36.7 °C)   TempSrc:   Oral Oral   SpO2:   95% 95%   Weight:       Height:           Patient was given the following medications:  Medications   linaclotide (LINZESS) capsule 290 mcg - PATIENT SUPPLIED (290 mcg Oral Not Given 8/24/20 0559)   metFORMIN (GLUCOPHAGE) tablet 500 mg (has no administration in time range)   verapamil (CALAN) tablet 120 mg (120 mg Oral Given 8/24/20 0813)   ferrous sulfate (IRON 325) tablet 325 mg (325 mg Oral Given 8/24/20 1138)   atorvastatin (LIPITOR) tablet 10 mg (10 mg Oral Not Given 8/23/20 2326)   atenolol (TENORMIN) tablet 100 mg (100 mg Oral Given 8/24/20 0811)   0.9 % sodium chloride infusion ( Intravenous New Bag 8/23/20 2325)   sodium chloride flush 0.9 % injection 10 mL (10 mLs Intravenous Given 8/24/20 0815)   sodium chloride flush 0.9 % injection 10 mL (has no administration in time range)   potassium chloride (KLOR-CON M) extended release tablet 40 mEq (40 mEq Oral Given 8/24/20 0811)     Or   potassium bicarb-citric acid (EFFER-K) effervescent tablet 40 mEq ( Oral See Alternative 8/24/20 9569)     Or   potassium chloride 10 mEq/100 mL IVPB (Peripheral Line) ( Intravenous See Alternative 8/24/20 0811)   acetaminophen (TYLENOL) tablet 650 mg (has no administration in time range)     Or   acetaminophen (TYLENOL) suppository 650 mg (has no administration in time range)   magnesium hydroxide (MILK OF MAGNESIA) 400 MG/5ML suspension 30 mL (has no administration in time range)   promethazine (PHENERGAN) tablet 12.5 mg (has no administration in time range)     Or   ondansetron (ZOFRAN) injection 4 mg (has no administration in time range)   famotidine (PEPCID) tablet 20 mg (has no administration in time range)   enoxaparin (LOVENOX) injection 40 mg (40 mg Subcutaneous Given 8/24/20

## 2020-08-24 PROBLEM — E11.9 DM2 (DIABETES MELLITUS, TYPE 2) (HCC): Status: ACTIVE | Noted: 2020-08-24

## 2020-08-24 PROBLEM — E78.00 HIGH CHOLESTEROL: Status: ACTIVE | Noted: 2020-08-24

## 2020-08-24 LAB
A/G RATIO: 1.6 (ref 1.1–2.2)
ALBUMIN SERPL-MCNC: 4.1 G/DL (ref 3.4–5)
ALP BLD-CCNC: 68 U/L (ref 40–129)
ALT SERPL-CCNC: 13 U/L (ref 10–40)
ANION GAP SERPL CALCULATED.3IONS-SCNC: 12 MMOL/L (ref 3–16)
AST SERPL-CCNC: 14 U/L (ref 15–37)
BASOPHILS ABSOLUTE: 0 K/UL (ref 0–0.2)
BASOPHILS RELATIVE PERCENT: 0.6 %
BILIRUB SERPL-MCNC: 0.7 MG/DL (ref 0–1)
BUN BLDV-MCNC: 7 MG/DL (ref 7–20)
CALCIUM SERPL-MCNC: 8.8 MG/DL (ref 8.3–10.6)
CHLORIDE BLD-SCNC: 89 MMOL/L (ref 99–110)
CO2: 27 MMOL/L (ref 21–32)
CREAT SERPL-MCNC: <0.5 MG/DL (ref 0.6–1.2)
EKG ATRIAL RATE: 65 BPM
EKG DIAGNOSIS: NORMAL
EKG P AXIS: 63 DEGREES
EKG P-R INTERVAL: 198 MS
EKG Q-T INTERVAL: 414 MS
EKG QRS DURATION: 76 MS
EKG QTC CALCULATION (BAZETT): 430 MS
EKG R AXIS: 32 DEGREES
EKG T AXIS: 13 DEGREES
EKG VENTRICULAR RATE: 65 BPM
EOSINOPHILS ABSOLUTE: 0.1 K/UL (ref 0–0.6)
EOSINOPHILS RELATIVE PERCENT: 1.9 %
GFR AFRICAN AMERICAN: >60
GFR NON-AFRICAN AMERICAN: >60
GLOBULIN: 2.6 G/DL
GLUCOSE BLD-MCNC: 111 MG/DL (ref 70–99)
HCT VFR BLD CALC: 34.8 % (ref 36–48)
HEMOGLOBIN: 12.1 G/DL (ref 12–16)
LYMPHOCYTES ABSOLUTE: 1.5 K/UL (ref 1–5.1)
LYMPHOCYTES RELATIVE PERCENT: 24.1 %
MAGNESIUM: 2.1 MG/DL (ref 1.8–2.4)
MCH RBC QN AUTO: 29.8 PG (ref 26–34)
MCHC RBC AUTO-ENTMCNC: 34.7 G/DL (ref 31–36)
MCV RBC AUTO: 85.8 FL (ref 80–100)
MONOCYTES ABSOLUTE: 0.8 K/UL (ref 0–1.3)
MONOCYTES RELATIVE PERCENT: 12.3 %
NEUTROPHILS ABSOLUTE: 3.8 K/UL (ref 1.7–7.7)
NEUTROPHILS RELATIVE PERCENT: 61.1 %
PDW BLD-RTO: 13 % (ref 12.4–15.4)
PLATELET # BLD: 231 K/UL (ref 135–450)
PMV BLD AUTO: 7.4 FL (ref 5–10.5)
POTASSIUM REFLEX MAGNESIUM: 3.4 MMOL/L (ref 3.5–5.1)
POTASSIUM SERPL-SCNC: 3.4 MMOL/L (ref 3.5–5.1)
RBC # BLD: 4.05 M/UL (ref 4–5.2)
SODIUM BLD-SCNC: 128 MMOL/L (ref 136–145)
TOTAL PROTEIN: 6.7 G/DL (ref 6.4–8.2)
WBC # BLD: 6.2 K/UL (ref 4–11)

## 2020-08-24 PROCEDURE — 97165 OT EVAL LOW COMPLEX 30 MIN: CPT

## 2020-08-24 PROCEDURE — 97162 PT EVAL MOD COMPLEX 30 MIN: CPT

## 2020-08-24 PROCEDURE — 97535 SELF CARE MNGMENT TRAINING: CPT

## 2020-08-24 PROCEDURE — 83735 ASSAY OF MAGNESIUM: CPT

## 2020-08-24 PROCEDURE — 80053 COMPREHEN METABOLIC PANEL: CPT

## 2020-08-24 PROCEDURE — 85025 COMPLETE CBC W/AUTO DIFF WBC: CPT

## 2020-08-24 PROCEDURE — 36415 COLL VENOUS BLD VENIPUNCTURE: CPT

## 2020-08-24 PROCEDURE — 96372 THER/PROPH/DIAG INJ SC/IM: CPT

## 2020-08-24 PROCEDURE — G0378 HOSPITAL OBSERVATION PER HR: HCPCS

## 2020-08-24 PROCEDURE — 6370000000 HC RX 637 (ALT 250 FOR IP): Performed by: INTERNAL MEDICINE

## 2020-08-24 PROCEDURE — 2580000003 HC RX 258: Performed by: INTERNAL MEDICINE

## 2020-08-24 PROCEDURE — 97116 GAIT TRAINING THERAPY: CPT

## 2020-08-24 PROCEDURE — 6360000002 HC RX W HCPCS: Performed by: INTERNAL MEDICINE

## 2020-08-24 PROCEDURE — 93010 ELECTROCARDIOGRAM REPORT: CPT | Performed by: INTERNAL MEDICINE

## 2020-08-24 PROCEDURE — 97530 THERAPEUTIC ACTIVITIES: CPT

## 2020-08-24 PROCEDURE — 1200000000 HC SEMI PRIVATE

## 2020-08-24 RX ADMIN — FERROUS SULFATE TAB 325 MG (65 MG ELEMENTAL FE) 325 MG: 325 (65 FE) TAB at 11:38

## 2020-08-24 RX ADMIN — POTASSIUM CHLORIDE 40 MEQ: 1500 TABLET, EXTENDED RELEASE ORAL at 08:11

## 2020-08-24 RX ADMIN — CYCLOBENZAPRINE 10 MG: 10 TABLET, FILM COATED ORAL at 14:52

## 2020-08-24 RX ADMIN — FERROUS SULFATE TAB 325 MG (65 MG ELEMENTAL FE) 325 MG: 325 (65 FE) TAB at 08:10

## 2020-08-24 RX ADMIN — ATORVASTATIN CALCIUM 10 MG: 10 TABLET, FILM COATED ORAL at 20:13

## 2020-08-24 RX ADMIN — ENOXAPARIN SODIUM 40 MG: 40 INJECTION SUBCUTANEOUS at 08:11

## 2020-08-24 RX ADMIN — Medication 10 ML: at 08:15

## 2020-08-24 RX ADMIN — HYDROCODONE BITARTRATE AND ACETAMINOPHEN 1 TABLET: 5; 325 TABLET ORAL at 14:52

## 2020-08-24 RX ADMIN — ATENOLOL 100 MG: 50 TABLET ORAL at 08:11

## 2020-08-24 RX ADMIN — CYCLOBENZAPRINE 10 MG: 10 TABLET, FILM COATED ORAL at 08:11

## 2020-08-24 RX ADMIN — VERAPAMIL HYDROCHLORIDE 120 MG: 80 TABLET, FILM COATED ORAL at 08:13

## 2020-08-24 RX ADMIN — HYDROCODONE BITARTRATE AND ACETAMINOPHEN 1 TABLET: 5; 325 TABLET ORAL at 23:13

## 2020-08-24 RX ADMIN — CYCLOBENZAPRINE 10 MG: 10 TABLET, FILM COATED ORAL at 23:13

## 2020-08-24 RX ADMIN — HYDROCODONE BITARTRATE AND ACETAMINOPHEN 1 TABLET: 5; 325 TABLET ORAL at 08:11

## 2020-08-24 RX ADMIN — VERAPAMIL HYDROCHLORIDE 120 MG: 80 TABLET, FILM COATED ORAL at 20:13

## 2020-08-24 RX ADMIN — FERROUS SULFATE TAB 325 MG (65 MG ELEMENTAL FE) 325 MG: 325 (65 FE) TAB at 16:19

## 2020-08-24 ASSESSMENT — PAIN SCALES - GENERAL
PAINLEVEL_OUTOF10: 8
PAINLEVEL_OUTOF10: 7
PAINLEVEL_OUTOF10: 6
PAINLEVEL_OUTOF10: 8
PAINLEVEL_OUTOF10: 7

## 2020-08-24 ASSESSMENT — PAIN DESCRIPTION - LOCATION
LOCATION: BACK

## 2020-08-24 ASSESSMENT — PAIN DESCRIPTION - DESCRIPTORS: DESCRIPTORS: OTHER (COMMENT)

## 2020-08-24 ASSESSMENT — ENCOUNTER SYMPTOMS
EYE REDNESS: 0
CHEST TIGHTNESS: 0
EYE PAIN: 0
STRIDOR: 0
NAUSEA: 0
VOMITING: 0
BACK PAIN: 1

## 2020-08-24 ASSESSMENT — PAIN DESCRIPTION - PAIN TYPE
TYPE: ACUTE PAIN

## 2020-08-24 ASSESSMENT — PAIN DESCRIPTION - ORIENTATION: ORIENTATION: MID

## 2020-08-24 NOTE — PROGRESS NOTES
Pt states she takes a sodium pill but is unaware of the dose, attempted to contact [facility] where Pt gets her medications filled at phone number [phone], they are unavailable at this time. Spoke with MD Umer Crawford about Pt requesting pain medication for her T12 fracture and received orders. Will medicate Pt once medications are available.

## 2020-08-24 NOTE — ED NOTES
Report called to Arturo Martin, nurse resuming care of patient. No questions or concerns at this time. Pt placed on telemetry monitor, transported via stretcher with belongings in tow.       Apoorva Ng RN  44/88/17 3056

## 2020-08-24 NOTE — PLAN OF CARE
Problem: Falls - Risk of:  Goal: Will remain free from falls  Description: Will remain free from falls  Outcome: Ongoing     Problem: Pain:  Description: Pain management should include both nonpharmacologic and pharmacologic interventions.   Goal: Pain level will decrease  Description: Pain level will decrease  Outcome: Ongoing

## 2020-08-24 NOTE — PROGRESS NOTES
Physical Therapy    Facility/Department: [facility] 3A NURSING  Initial Assessment    NAME: Kayla Bojorquez  : 1950  MRN: [id number]    Date of Service: 2020    Discharge Recommendations:  Kayla Bojorquez scored a 15/24 on the AM-PAC short mobility form. Current research shows that an AM-PAC score of 17 or less is typically not associated with a discharge to the patient's home setting. Based on the patient's AM-PAC score and their current functional mobility deficits, it is recommended that the patient have 3-5 sessions per week of Physical Therapy at d/c to increase the patient's independence. Please see assessment section for further patient specific details. If patient refuses, recommend home with 24 hour supervision (if available) and S3 level home care. HOME HEALTH CARE: LEVEL 3 SAFETY     - Initial home health evaluation to occur within 24-48 hours, in patient home   - Therapy evaluations in home within 24-48 hours of discharge; including DME and home safety   - Frontload therapy 5 days, then 3x a week   - Therapy to evaluate if patient has [address] needs for personal care   -  evaluation within 24-48 hours, includes evaluation of resources and insurance to determine [place], [place], LTC, and Medicaid options     If patient discharges prior to next session this note will serve as a discharge summary. Please see below for the latest assessment towards goals. 3-5 sessions per week   PT Equipment Recommendations  Equipment Needed: No  Other: Needs RW for safe ambulation currently - borrowing one from friend    Assessment   Body structures, Functions, Activity limitations: Decreased functional mobility ; Decreased strength;Decreased safe awareness;Decreased balance  Assessment: Patient not at baseline function. Significantly limited by back pain due to acute T12 compression fracture from fall.  Patient would benefit from skilled PT to address above deficits and faciltiate return to baseline function. Significant concern with patient returning to current home environment as patient has multple steps to enter home, needs new AD for ambulation, and has minimal assistance available to her. Recommend 24 hour supervision upon discharge. Patient hesitant to consider non-home discharge at this time  Treatment Diagnosis: decreased functional mobility, impaired gait, decreased balance  Prognosis: Good  Decision Making: Medium Complexity  Clinical Presentation: evolving  PT Education: Goals;PT Role;Plan of Care;Disease Specific Education  Patient Education: d/c recommendations at length - verbalized understanding  Barriers to Learning: hearing  REQUIRES PT FOLLOW UP: Yes  Activity Tolerance  Activity Tolerance: Patient limited by pain       Patient Diagnosis(es): The primary encounter diagnosis was Hyponatremia. Diagnoses of Hypokalemia, Hypomagnesemia, Asymptomatic bacteriuria, and Closed stable burst fracture of twelfth thoracic vertebra, initial encounter [facility]) were also pertinent to this visit. has a past medical history of Colon polyps, Constipation, Diabetes mellitus ([address] Hyperlipidemia, and Hypertension. has a past surgical history that includes  section; Upper gastrointestinal endoscopy (N/A, 2019); Upper gastrointestinal endoscopy (N/A, 2019); Colonoscopy (2019); Colonoscopy (N/A, 2019); Upper gastrointestinal endoscopy (N/A, 2020); Upper gastrointestinal endoscopy (N/A, 2020); and Upper gastrointestinal endoscopy (2020). Restrictions  Restrictions/Precautions  Restrictions/Precautions: Fall Risk(high fall risk)  Required Braces or Orthoses?: No  Position Activity Restriction  Other position/activity restrictions: Arch Christen, sustained a T12 compression fracture.  Came in due to increasing back pain  Vision/Hearing  Vision: Impaired  Vision Exceptions: Wears glasses at all times(currently not present in hospital)  Hearing: Exceptions to [facility]  Hearing Exceptions: Bilateral hearing aid(not currently at hospital)     Subjective  General  Chart Reviewed: Yes  Family / Caregiver Present: No  Diagnosis: hyponatremia, T12 compression fracture  Follows Commands: Within Functional Limits  General Comment  Comments: supine in bed upon arrival with alarm on  Subjective  Subjective: Reports pain \"getting better\" but continues to rate back pain at 7/10. Agreeable to therapy despite pain.  Nursing alerted  Pain Screening  Patient Currently in Pain: Yes  Pain Assessment  Pain Assessment: 0-10  Pain Level: 7  Vital Signs  Patient Currently in Pain: Yes       Orientation   WFL  Social/Functional History  Social/Functional History  Lives With: Son(states he's \"on the road\" and not available for assistance)  Type of Home: Apartment  Home Layout: One level(2nd floor apartment with 17 steps to get to 2nd level)  Home Access: Level entry  Bathroom Shower/Tub: Tub/Shower unit  Bathroom Toilet: Standard  Home Equipment: Rolling walker(states she borrowed a RW following her fall)  ADL Assistance: Independent  Homemaking Assistance: Independent  Ambulation Assistance: Independent  Transfer Assistance: Independent  Active : Yes  Occupation: Retired  Type of occupation: retired   Leisure & Hobbies: play cards with friends, listen to music  Additional Comments: reports 1 fall - resulting in current admission    Objective          AROM RLE (degrees)  RLE AROM: WFL  AROM LLE (degrees)  LLE AROM : WFL  Strength LLE  Strength LLE: WFL  Strength RUE  Strength RUE: WFL  Strength Other  Other: difficult to assess B due to report of increased pain        Bed mobility  Supine to Sit: Minimal assistance(max cues for log roll)  Sit to Supine: Stand by assistance(cue for log roll)  Transfers  Sit to Stand: Contact guard assistance  Stand to sit: Contact guard assistance  Ambulation  Ambulation?: Yes  Ambulation 1  Device: Rolling Walker  Assistance: Contact guard assistance  Quality

## 2020-08-24 NOTE — CARE COORDINATION
Discharge Planning Assessment  RN/SW discharge planner met with patient to discuss reason for admission, current living situation, and potential needs at the time of discharge    Demographics/Insurance verified Yes- Arturo    Current type of dwelling: Lives in an apartment- 16  Steps with railing    Patient from ECF/SW confirmed with: N/A    Living arrangements:  Lives alone, son who is long distance track  stays with her    Level of function/Support: Independent before admission    PCP:  Dr Khris Lindo    Last Visit to PCP: 8/2/20    DME:  Davion Hernandez    Active with any community resources/agencies/skilled home care: No    Medication compliance issues: Denies    Financial issues that could impact healthcare: No      Tentative discharge plan: home with HHS  Vs SNF    Discussed and provided facilities of choice if transition to a skilled nursing facility is required at the time of discharge- Yes  [facility] [employer]      Discussed with patient and/or family that on the day of discharge home tentative time of discharge will be between 10 AM and noon.     Transportation at the time of discharge: friend

## 2020-08-24 NOTE — ED PROVIDER NOTES
This patient was seen by the Mid-Level Provider. I have seen and examined the patient, agree with the workup, evaluation, management and diagnosis. Care plan has been discussed. My assessment reveals a 60-year-old female who states she was going to the bathroom became dizzy and then fell. She now complains of back pain. She fell on her back but denies any head injury. She landed on her buttocks. Radiology results:    CT ABDOMEN PELVIS W IV CONTRAST Additional Contrast? None   Preliminary Result   No acute intra-abdominal or intrapelvic abnormality noted. CT thoracic spine reported separately. CT lumbar spine demonstrates superior endplate fracture at J87 with minimal   loss of height and retropulsion. No significant narrowing of the neural   canal noted. Subtle findings are limited by underlying osteopenia. CT LUMBAR SPINE WO CONTRAST   Preliminary Result   No acute intra-abdominal or intrapelvic abnormality noted. CT thoracic spine reported separately. CT lumbar spine demonstrates superior endplate fracture at B76 with minimal   loss of height and retropulsion. No significant narrowing of the neural   canal noted. Subtle findings are limited by underlying osteopenia. CT THORACIC SPINE WO CONTRAST   Final Result   Acute burst type fracture of T12, resulting in mild loss of vertebral body   height.                LABS:    Labs Reviewed   CBC WITH AUTO DIFFERENTIAL - Abnormal; Notable for the following components:       Result Value    Neutrophils Absolute 8.0 (*)     All other components within normal limits    Narrative:     Performed at:  [facility] [address], [place], [address]   Phone [phone]   COMPREHENSIVE METABOLIC PANEL W/ REFLEX TO MG FOR LOW K - Abnormal; Notable for the following components:    Sodium 121 (*)     Potassium reflex Magnesium 3.1 (*)     Chloride 81 (*)     Glucose 113 (*) CREATININE <0.5 (*)     All other components within normal limits    Narrative:     Performed at:  [facility] [address] [place] [address] [employer]   Phone [phone]   URINE RT REFLEX TO CULTURE - Abnormal; Notable for the following components:    Clarity, UA CLOUDY (*)     Blood, Urine TRACE (*)     Protein, UA 30 (*)     All other components within normal limits    Narrative:     Performed at:  [facility] [address]  [place], [facility] [employer]   Phone [phone]   MICROSCOPIC URINALYSIS - Abnormal; Notable for the following components:    Bacteria, UA 4+ (*)     All other components within normal limits    Narrative:     Performed at:  [facility] [address],  [place], [facility] [employer]   Phone [phone]   MAGNESIUM - Abnormal; Notable for the following components:    Magnesium 1.40 (*)     All other components within normal limits    Narrative:     Performed at:  [facility] [address],  [place], [facility] [employer]   Phone [phone]   LIPASE    Narrative:     Performed at:  [facility] [address],  [place] [address] [employer]   Phone [phone]   PROTIME-INR    Narrative:     Performed at:  [facility] [address],  [place] [address] [employer]   Phone [phone]           EKG:    Sinus rhythm at a rate of 65 beats a minute with no acute ST elevations or depressions or pathologic Q waves. Normal axis. Exam:    Well-nourished female who appeared to be in pain but no acute distress. Neurologic she was alert and oriented with no focal findings. Medical decision makin-year-old female presents after falling on her buttocks. The patient's work-up included a multiple CTs that show a burst type fracture of T12 that appears to be stable.   Patient's work-up also showed some electrolyte abnormalities including a low sodium, low potassium and low magnesium. The patient will be admitted for further care, electrolyte replacement and pain control of her back with definitive consultation. She is in stable condition. FINAL IMPRESSION:    1. Hyponatremia    2. Hypokalemia    3. Hypomagnesemia    4. Asymptomatic bacteriuria    5.  Closed stable burst fracture of twelfth thoracic vertebra, initial encounter [facility])            Anastacia Ledbetter MD  08/23/20 5817

## 2020-08-24 NOTE — H&P
History and Physical  Dr. Gabby Knapp  8/23/2020    PCP: Tony Guerra MD    Cc:   Chief Complaint   Patient presents with    Fall     Pt states fell Friday when going to bathroom, pt states shes been having episoded of dizziness and seen PCP but no answers, thinks could be b/p meds, pt states this made her fall, pt with back pain       HPI:  Kp Ledesma is a 72 y.o. female who has a past medical history of Colon polyps, Constipation, Diabetes mellitus ([address] Hyperlipidemia, and Hypertension. Patient presents with Hyponatremia. HPI     72 y.o. female resenting with friend. Patient reports Saturday 2 AM in the morning she needed to use the restroom. She will, sat on the side of the bed and stood up to walk to the bathroom. Lights are out. It is totally dark. States she took a few steps felt lightheaded as sometimes occurs and she fell back landing on her buttock. She was stunned and the wind was knocked out of her. After about 10 minutes she was able to stand and go to the bathroom. Since the fall she has had increasing back pain and abdominal pain. She has no radicular components. No bowel or bladder dysfunction. No saddle anesthesia. She comes out for evaluation of back pain and abdominal pain. In ER, pain is constant, rated 7/10. Worse with movement, better with narcotics. Described as an aching pain    Ct from ER reviewed by self on computer   Impression:         No acute intra-abdominal or intrapelvic abnormality noted. CT thoracic spine reported separately. CT lumbar spine demonstrates superior endplate fracture at P12 with minimal   loss of height and retropulsion.  No significant narrowing of the neural   canal noted. Subtle findings are limited by underlying osteopenia.       Labs in ER also show electrolyte abnormalities, Low Na and Low K    Pt to be admitted for pain control, treatment of hyponatremia  Pt is noted to be on diuretics as outpt, they are stopped    Problem list of hospitalization thus far: Active Hospital Problems    Diagnosis    High cholesterol [E78.00]    DM2 (diabetes mellitus, type 2) ([facility] [E11.9]    Hyponatremia [E87.1]    Closed T12 fracture ([facility] [S22.862L]         Review of Systems: (1 system for EPF, 2-9 for detailed, 10+ for comprehensive)  Review of Systems   Constitutional: Negative for chills and fever. HENT: Negative for ear discharge and ear pain. Eyes: Negative for pain and redness. Respiratory: Negative for chest tightness and stridor. Cardiovascular: Negative for chest pain and leg swelling. Gastrointestinal: Negative for nausea and vomiting. Endocrine: Negative for cold intolerance and heat intolerance. Genitourinary: Negative for frequency and urgency. Musculoskeletal: Positive for back pain. Negative for neck pain. Allergic/Immunologic: Negative for food allergies. Neurological: Negative for speech difficulty and headaches. Hematological: Negative for adenopathy. Psychiatric/Behavioral: Negative for confusion. The patient is not hyperactive.             Past Medical History:   Past Medical History:   Diagnosis Date    Colon polyps     Constipation     Diabetes mellitus ([facility]     Hyperlipidemia     Hypertension        Past Surgical History:   Past Surgical History:   Procedure Laterality Date     SECTION      COLONOSCOPY  2019    COLONOSCOPY POLYPECTOMY SNARE/COLD BIOPSY performed by Millie Catherine MD at [address] [facility] N/A 2019    COLONOSCOPY POLYPECTOMY SNARE/COLD BIOPSY performed by Millie Catherine MD at [address] N/A 2019    EGD BIOPSY performed by Millie Catherine MD at [address] N/A 2019    EGD DILATION BALLOON [facility]    EGD WITH BALLOON  DILATION performed by Millie Catherine MD 12/5/16  Yes Vernetta Heimlich, MD   Amoxicillin 500 MG TABS Take 1 tablet by mouth 3 times daily 12/5/16   Vernetta Heimlich, MD   lubiprostone (AMITIZA) 24 MCG capsule Take 1 capsule by mouth daily (with breakfast) 9/29/16   Vernetta Heimlich, MD         No Known Allergies          EXAM: (2-7 system for EPF/Detailed, ?8 for Comprehensive)  /75   Pulse 63   Temp 98.2 °F (36.8 °C) (Oral)   Resp 14   Ht 5' 4\" (1.626 m)   Wt 124 lb (56.2 kg)   SpO2 94%   BMI 21.28 kg/m²   Constitutional: vitals as above: alert, appears stated age and cooperative  Psychiatric: normal insight and judgment, oriented to person, place, time, and general circumstances  Head: Normocephalic, without obvious abnormality, atraumatic  Eyes:lids and lashes normal, conjunctivae and sclerae normal and pupils equal, round, reactive to light and accomodation  EMNT: external ears normal, lps normal  Neck: no adenopathy, supple, symmetrical, trachea midline and thyroid not enlarged, symmetric, no tenderness/mass/nodules   Respiratory: clear to auscultation and percussion bilaterally with normal respiratory effort  Cardiovascular: normal rate, regular rhythm, normal S1 and S2 and no gallops  Gastrointestinal: soft, non-tender, non-distended, normal bowel sounds, no masses or organomegaly  Lymphatic:   Extremities: no edema, no clubbing   Skin:No rashes or nodules noted.   Neurologic:    LABS:  Labs Reviewed   CBC WITH AUTO DIFFERENTIAL - Abnormal; Notable for the following components:       Result Value    Neutrophils Absolute 8.0 (*)     All other components within normal limits    Narrative:     Performed at:  [facility] [address], [place], [address]   Phone [phone]   COMPREHENSIVE METABOLIC PANEL W/ REFLEX TO MG FOR LOW K - Abnormal; Notable for the following components:    Sodium 121 (*)     Potassium reflex Magnesium 3.1 (*)     Chloride 81 (*)     Glucose 113 (*)     CREATININE <0.5 (*) All other components within normal limits    Narrative:     Performed at:  [facility] [address]   Phone [phone]   URINE RT REFLEX TO CULTURE - Abnormal; Notable for the following components:    Clarity, UA CLOUDY (*)     Blood, Urine TRACE (*)     Protein, UA 30 (*)     All other components within normal limits    Narrative:     Performed at:  [facility] [address]   Phone [phone]   MICROSCOPIC URINALYSIS - Abnormal; Notable for the following components:    Bacteria, UA 4+ (*)     All other components within normal limits    Narrative:     Performed at:  [facility] [address]   Phone [phone]   MAGNESIUM - Abnormal; Notable for the following components:    Magnesium 1.40 (*)     All other components within normal limits    Narrative:     Performed at:  [facility] [address]   Phone [phone]   LIPASE    Narrative:     Performed at:  [facility] [address]   Phone [phone]   PROTIME-INR    Narrative:     Performed at:  [facility] [address]   Phone [phone]         IMAGING:  Imaging results from the ER have been reviewed in the computerized chart. Ct Thoracic Spine Wo Contrast    Result Date: 8/23/2020  EXAMINATION: CT OF THE THORACIC SPINE WITHOUT CONTRAST  8/23/2020 7:31 pm: TECHNIQUE: CT of the thoracic spine was performed without the administration of intravenous contrast. Multiplanar reformatted images are provided for review. Dose modulation, iterative reconstruction, and/or weight based adjustment of the mA/kV was utilized to reduce the radiation dose to as low as reasonably achievable. COMPARISON: None.  HISTORY: ORDERING SYSTEM PROVIDED HISTORY: Fall TECHNOLOGIST PROVIDED HISTORY: Reason for exam:->Fall Reason for Exam: fall Acuity: Acute Type of Exam: Initial FINDINGS: BONES/ALIGNMENT: There is normal alignment of the spine. There is a chronic compression fracture of T10 as well as an acute superior endplate fracture of T65 resulting in mild loss of vertebral body height. There is a minimal amount of retropulsed bone, without significant osseous canal narrowing. No osseous destructive lesion is seen. DEGENERATIVE CHANGES: No gross spinal canal stenosis or bony neural foraminal narrowing of the thoracic spine. SOFT TISSUES: No paraspinal mass is seen. Acute burst type fracture of T12, resulting in mild loss of vertebral body height. Ct Lumbar Spine Wo Contrast    Result Date: 8/23/2020  EXAMINATION: CT OF THE ABDOMEN AND PELVIS WITH CONTRAST; CT OF THE LUMBAR SPINE WITHOUT CONTRAST 8/23/2020 7:32 pm; 8/23/2020 7:31 pm TECHNIQUE: CT of the abdomen and pelvis was performed with the administration of intravenous contrast. Multiplanar reformatted images are provided for review. Dose modulation, iterative reconstruction, and/or weight based adjustment of the mA/kV was utilized to reduce the radiation dose to as low as reasonably achievable.; CT of the lumbar spine was performed without the administration of intravenous contrast. Multiplanar reformatted images are provided for review. Dose modulation, iterative reconstruction, and/or weight based adjustment of the mA/kV was utilized to reduce the radiation dose to as low as reasonably achievable. COMPARISON: None.  HISTORY: ORDERING SYSTEM PROVIDED HISTORY: Progressive abdominal pain following a fall 1930 6 hours ago TECHNOLOGIST PROVIDED HISTORY: Reason for exam:->Progressive abdominal pain following a fall 1930 6 hours ago Additional Contrast?->None Reason for Exam: abd pain after fall Acuity: Acute Type of Exam: Initial; ORDERING SYSTEM PROVIDED HISTORY: Fall achievable.; CT of the lumbar spine was performed without the administration of intravenous contrast. Multiplanar reformatted images are provided for review. Dose modulation, iterative reconstruction, and/or weight based adjustment of the mA/kV was utilized to reduce the radiation dose to as low as reasonably achievable. COMPARISON: None. HISTORY: ORDERING SYSTEM PROVIDED HISTORY: Progressive abdominal pain following a fall 1930 6 hours ago TECHNOLOGIST PROVIDED HISTORY: Reason for exam:->Progressive abdominal pain following a fall 1930 6 hours ago Additional Contrast?->None Reason for Exam: abd pain after fall Acuity: Acute Type of Exam: Initial; ORDERING SYSTEM PROVIDED HISTORY: Fall TECHNOLOGIST PROVIDED HISTORY: Reason for exam:->Fall Reason for Exam: fall Acuity: Acute Type of Exam: Initial FINDINGS: Lower Chest: There is evidence of old granulomatous disease. No free air noted within the abdomen or pelvis. Organs: Liver, gallbladder, adrenal glands, kidneys, spleen and pancreas are unremarkable in appearance. GI/Bowel: Stomach is unremarkable in appearance. Duodenal diverticulum is noted. No acute abnormality appreciated about the small bowel. There is diverticulosis without evidence of acute diverticulitis. Appendix is visualized and appears normal. Pelvis: Urinary bladder is distended. The uterus and ovaries are unremarkable. No free fluid noted. Peritoneum/Retroperitoneum: The aorta is normal in caliber. No suspicious retroperitoneal adenopathy is noted. Bones/Soft Tissues: Bony pelvis appears intact. CT lumbar spine: Subtle findings are limited by osteopenia. Subtle fracture involving the superior endplate of J12 extending posteriorly noted without significant narrowing of the central canal.  Remainder of the lumbar spine appears intact. No acute intra-abdominal or intrapelvic abnormality noted. CT thoracic spine reported separately.  CT lumbar spine demonstrates superior endplate fracture at R15 with minimal loss of height and retropulsion. No significant narrowing of the neural canal noted. Subtle findings are limited by underlying osteopenia. EKG: from ER, interpreted by self, normal sinus rhythm at 65. No acute st elevation noted, no TWI seen. MEDICAL DECISION MAKING:    Principal Problem:    Hyponatremia -New Problem to me. Na is critially low, 121. Pt is noted to be on diuretic as outpt  Plan: admit, stop diuretic, rehydrate with normal saline iv infusion  Active Problems:    Closed T12 fracture (HCC) -Established problem. Stable. S/p fall. No retropulsion noted, no loss of height  Plan: pt/ot to see. Work on pain control with narcotics. Iv meds ordered prn    High cholesterol -Established problem. Stable. Plan: Continue present orders/plan. DM2 (diabetes mellitus, type 2) ([address] -Established problem. Stable. Sugars ok in ER  Plan: Patient placed on controlled carbohydrate diet. Fingerstick sugars to be checked to monitor for both hypoglycemia as well as hyperglycemia. Sliding scale insulin ordered. Glucagon and dextrose ordered for hypoglycemia. Patient will be continued on home medications. Hemoglobin a1c to be ordered to assess efficacy of therapy. Diagnoses as listed above, designated as new or established and plan outlined for each. Data Reviewed:   (1) Lab tests were reviewed or ordered. (1) Radiology tests were reviewed or ordered. (1) Medical test (Echo, EKG, PFT/len) were not ordered. (1)History was not obtained from someone other than patient  (1) Old records  were reviewed - see HPI/MDM for pertinent details if review done. (2) Case wasdiscussed with another health care provider: JACKIE Osorio  (2) Imaging was viewed by myself. Cat scan  (2) EKG  was viewed by myself.        The patient isbeing placed in inpatient status with the expectation of requiring a hospital stay spanning at least two midnights for care and treatment of the problems noted

## 2020-08-24 NOTE — PROGRESS NOTES
Occupational Therapy   Occupational Therapy Initial Assessment  Date: 2020   Patient Name: Philip Gitelman  MRN: [id number]     : 1950    Date of Service: 2020    Discharge Recommendations:    Philip Gitelman scored a 19/24 on the AM-PAC ADL Inpatient form. Current research shows that an AM-PAC score of 17 or less is typically not associated with a discharge to the patient's home setting. Based on the patient's AM-PAC score and their current ADL deficits, it is recommended that the patient have 3-5 sessions per week of Occupational Therapy at d/c to increase the patient's independence. Please see assessment section for further patient specific details. If patient discharges prior to next session this note will serve as a discharge summary. Please see below for the latest assessment towards goals. OT Equipment Recommendations  Equipment Needed: Yes  Mobility Devices: ADL Assistive Devices  ADL Assistive Devices: Reacher;Long-handled Sponge    Assessment   Performance deficits / Impairments: Decreased functional mobility ; Decreased ADL status; Decreased high-level IADLs  Treatment Diagnosis: decreased independence with ADL related to late affects of compression fx  Prognosis: Good  Decision Making: Low Complexity  Assistance / Modification: physical assist, rw  OT Education: OT Role;Plan of Care;ADL Adaptive Strategies  Patient Education: OT eval, plan of care, use of adaptive equipment  Barriers to Learning: none  REQUIRES OT FOLLOW UP: Yes  Activity Tolerance  Activity Tolerance: Patient limited by pain  Safety Devices  Safety Devices in place: Yes  Type of devices: All fall risk precautions in place;Call light within reach;Nurse notified; Patient at risk for falls; Left in bed;Bed alarm in place           Patient Diagnosis(es): The primary encounter diagnosis was Hyponatremia.  Diagnoses of Hypokalemia, Hypomagnesemia, Asymptomatic bacteriuria, and Closed stable burst fracture of twelfth thoracic vertebra, initial encounter [facility]) were also pertinent to this visit. has a past medical history of Colon polyps, Constipation, Diabetes mellitus ([address] Hyperlipidemia, and Hypertension. has a past surgical history that includes  section; Upper gastrointestinal endoscopy (N/A, 2019); Upper gastrointestinal endoscopy (N/A, 2019); Colonoscopy (2019); Colonoscopy (N/A, 2019); Upper gastrointestinal endoscopy (N/A, 2020); Upper gastrointestinal endoscopy (N/A, 2020); and Upper gastrointestinal endoscopy (2020). Treatment Diagnosis: decreased independence with ADL related to late affects of compression fx      Restrictions  Restrictions/Precautions  Restrictions/Precautions: Fall Risk(high fall risk)  Required Braces or Orthoses?: No  Position Activity Restriction  Other position/activity restrictions: Vito Abraham, sustained a T12 compression fracture. Came in due to increasing back pain reports she got dizzy and fell    Subjective   Patient's goal is to decrease pain and increase mobility. She is receptive to need for continued in patient rehabilitation if she has to for safety prior to going home. Her first choice would be [facility].     General  Chart Reviewed: Yes  Family / Caregiver Present: No  Diagnosis: status post fall compression fx, patient reports she got dizzy and fell at 2 AM when she got up to go to the bathroom  Patient Currently in Pain: Yes  Pain Assessment  Pain Assessment: 0-10  Pain Level: 7  Pain Type: Acute pain  Pain Location: Back  Vital Signs  Patient Currently in Pain: Yes  Social/Functional History  Social/Functional History  Lives With: Son(states he's \"on the road\" and not available for assistance)  Type of Home: [address]: One level(2nd floor apartment with 17 steps to get to 2nd level)  Home Access: Level entry  Bathroom Shower/Tub: Tub/Shower unit, Shower chair without back  Bathroom Toilet: Standard  Home Equipment: Rolling walker(states she borrowed a RW following her fall)  ADL Assistance: Independent  Homemaking Assistance: Independent  Ambulation Assistance: Independent  Transfer Assistance: Independent  Active : Yes  Occupation: Retired  Type of occupation: retired   Leisure & Hobbies: play cards with friends, listen to music  Additional Comments: reports 1 fall - resulting in current admission       Objective   Vision: Impaired  Vision Exceptions: Wears glasses at all times  Hearing: Exceptions to [employer]  Hearing Exceptions: Hard of hearing/hearing concerns       Observation/Palpation  Posture: Fair  Observation: mild kyphosis  Balance  Sitting Balance: Supervision  Standing Balance: Minimal assistance  Standing Balance  Time: up to 5 minutes  Activity: ambulation to door and back  Functional Mobility  Activity: To/from bathroom  Assist Level: Minimal assistance  Toilet Transfers  Toilet - Technique: Ambulating  Toilet Transfer: Minimal assistance  ADL  LE Dressing: Minimal assistance  Additional Comments: donned and doffed socks seated at edge of bed with great difficulty  Tone RUE  RUE Tone: Normotonic  Tone LUE  LUE Tone: Normotonic  Coordination  Movements Are Fluid And Coordinated: Yes     Bed mobility  Supine to Sit: Minimal assistance  Transfers  Sit to stand: Minimal assistance  Stand to sit: Minimal assistance     Cognition  Overall Cognitive Status: WNL        Sensation  Overall Sensation Status: WNL        LUE AROM (degrees)  LUE AROM : WNL  Left Hand AROM (degrees)  Left Hand AROM: WNL  RUE AROM (degrees)  RUE AROM : WNL  Right Hand PROM (degrees)  Right Hand PROM: WNL                      Plan   Plan  Times per week: 5-7  Times per day: Daily  Current Treatment Recommendations: Safety Education & Training, Self-Care / ADL, Functional Mobility Training, Balance Training, Equipment Evaluation, Education, & procurement    G-Code     OutComes Score                                                  AM-PAC Score        AM-PAC Inpatient Daily Activity Raw Score: 19 (08/24/20 1403)  AM-PAC Inpatient ADL T-Scale Score : 40.22 (08/24/20 1403)  ADL Inpatient CMS 0-100% Score: 42.8 (08/24/20 1403)  ADL Inpatient CMS G-Code Modifier : CK (08/24/20 1403)    Goals  Short term goals  Time Frame for Short term goals: discharge  Short term goal 1: modified independent throughout self care  Short term goal 2: modified independent functional mobility with rw  Patient Goals   Patient goals : patient's goal is to return home       Therapy Time   Individual Concurrent Group Co-treatment   Time In 1330         Time Out 1408         Minutes 38         Timed Code Treatment Minutes: 400 [facility], OT

## 2020-08-24 NOTE — PROGRESS NOTES
Progress Note - Dr. Trey Goodman - Internal Medicine  PCP: MD Gurjit Juárez 75 / [address] [facility] [address] [facility] Day: 1  Code Status: Full Code  Current Diet: DIET CARB CONTROL;        CC: follow up on medical issues    Subjective:   Geneva Alarcon is a 72 y.o. female. Doing ok  Pain is beter controlled, but still 3/10    She denies chest pain, denies shortness of breath, denies nausea,  denies emesis. 10 system Review of Systems is reviewed with patient, and pertinent positives are noted in HPI above . Otherwise, Review of systems is negative. I have reviewed the patient's medical and social history in detail and updated the computerized patient record. To recap: She  has a past medical history of Colon polyps, Constipation, Diabetes mellitus ([facility] 75.), Hyperlipidemia, and Hypertension. . She  has a past surgical history that includes  section; Upper gastrointestinal endoscopy (N/A, 2019); Upper gastrointestinal endoscopy (N/A, 2019); Colonoscopy (2019); Colonoscopy (N/A, 2019); Upper gastrointestinal endoscopy (N/A, 2020); Upper gastrointestinal endoscopy (N/A, 2020); and Upper gastrointestinal endoscopy (2020). . She  reports that she has never smoked. She has never used smokeless tobacco. She reports current alcohol use of about 18.0 standard drinks of alcohol per week. She reports that she does not use drugs. .        Active Hospital Problems    Diagnosis Date Noted    High cholesterol [E78.00] 2020    DM2 (diabetes mellitus, type 2) ([facility].) [E11.9] 2020    Hyponatremia [E87.1] 2020    Closed T12 fracture ([facility] [S22.089A] 2020       Current Facility-Administered Medications: linaclotide (LINZESS) capsule 290 mcg - PATIENT SUPPLIED, 290 mcg, Oral, QAM AC  [START ON 2020] metFORMIN (GLUCOPHAGE) tablet 500 mg, 500 mg, Oral, BID WC  verapamil (CALAN) tablet 120 mg, 120 mg, Oral, BID  ferrous sulfate (IRON 325) tablet 325 mg, 325 mg, Oral, TID WC  atorvastatin (LIPITOR) tablet 10 mg, 10 mg, Oral, Nightly  atenolol (TENORMIN) tablet 100 mg, 100 mg, Oral, Daily  0.9 % sodium chloride infusion, , Intravenous, Continuous  sodium chloride flush 0.9 % injection 10 mL, 10 mL, Intravenous, 2 times per day  sodium chloride flush 0.9 % injection 10 mL, 10 mL, Intravenous, PRN  potassium chloride (KLOR-CON M) extended release tablet 40 mEq, 40 mEq, Oral, PRN **OR** potassium bicarb-citric acid (EFFER-K) effervescent tablet 40 mEq, 40 mEq, Oral, PRN **OR** potassium chloride 10 mEq/100 mL IVPB (Peripheral Line), 10 mEq, Intravenous, PRN  acetaminophen (TYLENOL) tablet 650 mg, 650 mg, Oral, Q6H PRN **OR** acetaminophen (TYLENOL) suppository 650 mg, 650 mg, Rectal, Q6H PRN  magnesium hydroxide (MILK OF MAGNESIA) 400 MG/5ML suspension 30 mL, 30 mL, Oral, Daily PRN  promethazine (PHENERGAN) tablet 12.5 mg, 12.5 mg, Oral, Q6H PRN **OR** ondansetron (ZOFRAN) injection 4 mg, 4 mg, Intravenous, Q6H PRN  famotidine (PEPCID) tablet 20 mg, 20 mg, Oral, BID PRN  enoxaparin (LOVENOX) injection 40 mg, 40 mg, Subcutaneous, Daily  hydrALAZINE (APRESOLINE) injection 10 mg, 10 mg, Intravenous, Q6H PRN  0.9 % sodium chloride bolus, 500 mL, Intravenous, PRN  HYDROcodone-acetaminophen (NORCO) 5-325 MG per tablet 1 tablet, 1 tablet, Oral, Q6H PRN  cyclobenzaprine (FLEXERIL) tablet 10 mg, 10 mg, Oral, TID PRN         Objective:  BP (!) 146/70   Pulse 56   Temp 98.6 °F (37 °C) (Oral)   Resp 16   Ht 5' 4\" (1.626 m)   Wt 117 lb 4.6 oz (53.2 kg)   SpO2 95%   BMI 20.13 kg/m²      Patient Vitals for the past 24 hrs:   BP Temp Temp src Pulse Resp SpO2 Height Weight   08/24/20 0808 (!) 146/70 98.6 °F (37 °C) Oral 56 16 95 % -- --   08/24/20 0556 -- -- -- 57 -- -- -- --   08/24/20 0351 -- -- -- 54 -- -- -- --   08/24/20 0347 (!) 108/59 96.9 °F (36.1 °C) Temporal 58 16 96 % -- --   08/24/20 0205 -- -- -- 55 -- -- -- --   08/24/20 0013 -- -- -- 64 -- -- -- 08/24/2020    CO2 27 08/24/2020    TSH 1.890 01/13/2015    INR 1.03 08/23/2020    LABA1C 5.6 09/29/2016    LABMICR YES 08/23/2020     No results found for: CKTOTAL, CKMB, CKMBINDEX, TROPONINI    Recent Imaging Results are Reviewed:  Ct Thoracic Spine Wo Contrast    Result Date: 8/23/2020  EXAMINATION: CT OF THE THORACIC SPINE WITHOUT CONTRAST  8/23/2020 7:31 pm: TECHNIQUE: CT of the thoracic spine was performed without the administration of intravenous contrast. Multiplanar reformatted images are provided for review. Dose modulation, iterative reconstruction, and/or weight based adjustment of the mA/kV was utilized to reduce the radiation dose to as low as reasonably achievable. COMPARISON: None. HISTORY: ORDERING SYSTEM PROVIDED HISTORY: Fall TECHNOLOGIST PROVIDED HISTORY: Reason for exam:->Fall Reason for Exam: fall Acuity: Acute Type of Exam: Initial FINDINGS: BONES/ALIGNMENT: There is normal alignment of the spine. There is a chronic compression fracture of T10 as well as an acute superior endplate fracture of G78 resulting in mild loss of vertebral body height. There is a minimal amount of retropulsed bone, without significant osseous canal narrowing. No osseous destructive lesion is seen. DEGENERATIVE CHANGES: No gross spinal canal stenosis or bony neural foraminal narrowing of the thoracic spine. SOFT TISSUES: No paraspinal mass is seen. Acute burst type fracture of T12, resulting in mild loss of vertebral body height. Ct Lumbar Spine Wo Contrast    Result Date: 8/23/2020  EXAMINATION: CT OF THE ABDOMEN AND PELVIS WITH CONTRAST; CT OF THE LUMBAR SPINE WITHOUT CONTRAST 8/23/2020 7:32 pm; 8/23/2020 7:31 pm TECHNIQUE: CT of the abdomen and pelvis was performed with the administration of intravenous contrast. Multiplanar reformatted images are provided for review.  Dose modulation, iterative reconstruction, and/or weight based adjustment of the mA/kV was utilized to reduce the radiation dose to as low as reasonably achievable.; CT of the lumbar spine was performed without the administration of intravenous contrast. Multiplanar reformatted images are provided for review. Dose modulation, iterative reconstruction, and/or weight based adjustment of the mA/kV was utilized to reduce the radiation dose to as low as reasonably achievable. COMPARISON: None. HISTORY: ORDERING SYSTEM PROVIDED HISTORY: Progressive abdominal pain following a fall 1930 6 hours ago TECHNOLOGIST PROVIDED HISTORY: Reason for exam:->Progressive abdominal pain following a fall 1930 6 hours ago Additional Contrast?->None Reason for Exam: abd pain after fall Acuity: Acute Type of Exam: Initial; ORDERING SYSTEM PROVIDED HISTORY: Fall TECHNOLOGIST PROVIDED HISTORY: Reason for exam:->Fall Reason for Exam: fall Acuity: Acute Type of Exam: Initial FINDINGS: Lower Chest: There is evidence of old granulomatous disease. No free air noted within the abdomen or pelvis. Organs: Liver, gallbladder, adrenal glands, kidneys, spleen and pancreas are unremarkable in appearance. GI/Bowel: Stomach is unremarkable in appearance. Duodenal diverticulum is noted. No acute abnormality appreciated about the small bowel. There is diverticulosis without evidence of acute diverticulitis. Appendix is visualized and appears normal. Pelvis: Urinary bladder is distended. The uterus and ovaries are unremarkable. No free fluid noted. Peritoneum/Retroperitoneum: The aorta is normal in caliber. No suspicious retroperitoneal adenopathy is noted. Bones/Soft Tissues: Bony pelvis appears intact. CT lumbar spine: Subtle findings are limited by osteopenia. Subtle fracture involving the superior endplate of D01 extending posteriorly noted without significant narrowing of the central canal.  Remainder of the lumbar spine appears intact. No acute intra-abdominal or intrapelvic abnormality noted. CT thoracic spine reported separately.  CT lumbar spine demonstrates superior endplate fracture at T12 with minimal loss of height and retropulsion. No significant narrowing of the neural canal noted. Subtle findings are limited by underlying osteopenia. Ct Abdomen Pelvis W Iv Contrast Additional Contrast? None    Result Date: 8/23/2020  EXAMINATION: CT OF THE ABDOMEN AND PELVIS WITH CONTRAST; CT OF THE LUMBAR SPINE WITHOUT CONTRAST 8/23/2020 7:32 pm; 8/23/2020 7:31 pm TECHNIQUE: CT of the abdomen and pelvis was performed with the administration of intravenous contrast. Multiplanar reformatted images are provided for review. Dose modulation, iterative reconstruction, and/or weight based adjustment of the mA/kV was utilized to reduce the radiation dose to as low as reasonably achievable.; CT of the lumbar spine was performed without the administration of intravenous contrast. Multiplanar reformatted images are provided for review. Dose modulation, iterative reconstruction, and/or weight based adjustment of the mA/kV was utilized to reduce the radiation dose to as low as reasonably achievable. COMPARISON: None. HISTORY: ORDERING SYSTEM PROVIDED HISTORY: Progressive abdominal pain following a fall 1930 6 hours ago TECHNOLOGIST PROVIDED HISTORY: Reason for exam:->Progressive abdominal pain following a fall 1930 6 hours ago Additional Contrast?->None Reason for Exam: abd pain after fall Acuity: Acute Type of Exam: Initial; ORDERING SYSTEM PROVIDED HISTORY: Fall TECHNOLOGIST PROVIDED HISTORY: Reason for exam:->Fall Reason for Exam: fall Acuity: Acute Type of Exam: Initial FINDINGS: Lower Chest: There is evidence of old granulomatous disease. No free air noted within the abdomen or pelvis. Organs: Liver, gallbladder, adrenal glands, kidneys, spleen and pancreas are unremarkable in appearance. GI/Bowel: Stomach is unremarkable in appearance. Duodenal diverticulum is noted. No acute abnormality appreciated about the small bowel. There is diverticulosis without evidence of acute diverticulitis.   Appendix is visualized and appears normal. Pelvis: Urinary bladder is distended. The uterus and ovaries are unremarkable. No free fluid noted. Peritoneum/Retroperitoneum: The aorta is normal in caliber. No suspicious retroperitoneal adenopathy is noted. Bones/Soft Tissues: Bony pelvis appears intact. CT lumbar spine: Subtle findings are limited by osteopenia. Subtle fracture involving the superior endplate of U37 extending posteriorly noted without significant narrowing of the central canal.  Remainder of the lumbar spine appears intact. No acute intra-abdominal or intrapelvic abnormality noted. CT thoracic spine reported separately. CT lumbar spine demonstrates superior endplate fracture at P30 with minimal loss of height and retropulsion. No significant narrowing of the neural canal noted. Subtle findings are limited by underlying osteopenia. Assessment and Plan:  Principal Problem:    Hyponatremia -Established problem. Improving. Na improved with fluids and discontinuation of chlorthalidone  Plan: cont current plan. Repeat labs ordered for am  Active Problems:    Closed T12 fracture ([address] -Established problem. Stable. Plan: cont narcotics, flexerial.  OK for therapy to work with pt    High cholesterol -Established problem. Stable. Plan: Continue present orders/plan. DM2 (diabetes mellitus, type 2) ([address] -Established problem. Stable.   Glu 111 this am  Plan: stay on ccc diet, sliding sclae home meds            Panchito Claytoning  8/24/2020

## 2020-08-25 LAB
ANION GAP SERPL CALCULATED.3IONS-SCNC: 8 MMOL/L (ref 3–16)
BASOPHILS ABSOLUTE: 0 K/UL (ref 0–0.2)
BASOPHILS RELATIVE PERCENT: 0.5 %
BUN BLDV-MCNC: 5 MG/DL (ref 7–20)
CALCIUM SERPL-MCNC: 8.5 MG/DL (ref 8.3–10.6)
CHLORIDE BLD-SCNC: 95 MMOL/L (ref 99–110)
CO2: 27 MMOL/L (ref 21–32)
CREAT SERPL-MCNC: <0.5 MG/DL (ref 0.6–1.2)
EOSINOPHILS ABSOLUTE: 0.1 K/UL (ref 0–0.6)
EOSINOPHILS RELATIVE PERCENT: 2.1 %
GFR AFRICAN AMERICAN: >60
GFR NON-AFRICAN AMERICAN: >60
GLUCOSE BLD-MCNC: 102 MG/DL (ref 70–99)
HCT VFR BLD CALC: 32.2 % (ref 36–48)
HEMOGLOBIN: 11.4 G/DL (ref 12–16)
LYMPHOCYTES ABSOLUTE: 1.4 K/UL (ref 1–5.1)
LYMPHOCYTES RELATIVE PERCENT: 22.9 %
MCH RBC QN AUTO: 30.9 PG (ref 26–34)
MCHC RBC AUTO-ENTMCNC: 35.4 G/DL (ref 31–36)
MCV RBC AUTO: 87.2 FL (ref 80–100)
MONOCYTES ABSOLUTE: 0.7 K/UL (ref 0–1.3)
MONOCYTES RELATIVE PERCENT: 11.1 %
NEUTROPHILS ABSOLUTE: 3.9 K/UL (ref 1.7–7.7)
NEUTROPHILS RELATIVE PERCENT: 63.4 %
PDW BLD-RTO: 13.1 % (ref 12.4–15.4)
PLATELET # BLD: 235 K/UL (ref 135–450)
PMV BLD AUTO: 7.1 FL (ref 5–10.5)
POTASSIUM SERPL-SCNC: 3.6 MMOL/L (ref 3.5–5.1)
RBC # BLD: 3.7 M/UL (ref 4–5.2)
SODIUM BLD-SCNC: 130 MMOL/L (ref 136–145)
WBC # BLD: 6.1 K/UL (ref 4–11)

## 2020-08-25 PROCEDURE — 85025 COMPLETE CBC W/AUTO DIFF WBC: CPT

## 2020-08-25 PROCEDURE — G0378 HOSPITAL OBSERVATION PER HR: HCPCS

## 2020-08-25 PROCEDURE — 96372 THER/PROPH/DIAG INJ SC/IM: CPT

## 2020-08-25 PROCEDURE — 36415 COLL VENOUS BLD VENIPUNCTURE: CPT

## 2020-08-25 PROCEDURE — 6370000000 HC RX 637 (ALT 250 FOR IP): Performed by: INTERNAL MEDICINE

## 2020-08-25 PROCEDURE — 1200000000 HC SEMI PRIVATE

## 2020-08-25 PROCEDURE — U0003 INFECTIOUS AGENT DETECTION BY NUCLEIC ACID (DNA OR RNA); SEVERE ACUTE RESPIRATORY SYNDROME CORONAVIRUS 2 (SARS-COV-2) (CORONAVIRUS DISEASE [COVID-19]), AMPLIFIED PROBE TECHNIQUE, MAKING USE OF HIGH THROUGHPUT TECHNOLOGIES AS DESCRIBED BY CMS-2020-01-R: HCPCS

## 2020-08-25 PROCEDURE — 97116 GAIT TRAINING THERAPY: CPT

## 2020-08-25 PROCEDURE — 97530 THERAPEUTIC ACTIVITIES: CPT

## 2020-08-25 PROCEDURE — 2580000003 HC RX 258: Performed by: INTERNAL MEDICINE

## 2020-08-25 PROCEDURE — 6360000002 HC RX W HCPCS: Performed by: INTERNAL MEDICINE

## 2020-08-25 PROCEDURE — 80048 BASIC METABOLIC PNL TOTAL CA: CPT

## 2020-08-25 RX ORDER — OXYCODONE HYDROCHLORIDE AND ACETAMINOPHEN 5; 325 MG/1; MG/1
1 TABLET ORAL EVERY 6 HOURS PRN
Status: DISCONTINUED | OUTPATIENT
Start: 2020-08-25 | End: 2020-08-27 | Stop reason: HOSPADM

## 2020-08-25 RX ADMIN — FERROUS SULFATE TAB 325 MG (65 MG ELEMENTAL FE) 325 MG: 325 (65 FE) TAB at 12:29

## 2020-08-25 RX ADMIN — OXYCODONE HYDROCHLORIDE AND ACETAMINOPHEN 1 TABLET: 5; 325 TABLET ORAL at 18:08

## 2020-08-25 RX ADMIN — MAGNESIUM HYDROXIDE 30 ML: 400 SUSPENSION ORAL at 12:27

## 2020-08-25 RX ADMIN — ATORVASTATIN CALCIUM 10 MG: 10 TABLET, FILM COATED ORAL at 20:50

## 2020-08-25 RX ADMIN — SODIUM CHLORIDE: 9 INJECTION, SOLUTION INTRAVENOUS at 04:16

## 2020-08-25 RX ADMIN — Medication 10 ML: at 20:50

## 2020-08-25 RX ADMIN — VERAPAMIL HYDROCHLORIDE 120 MG: 80 TABLET, FILM COATED ORAL at 09:02

## 2020-08-25 RX ADMIN — CYCLOBENZAPRINE 10 MG: 10 TABLET, FILM COATED ORAL at 09:01

## 2020-08-25 RX ADMIN — OXYCODONE HYDROCHLORIDE AND ACETAMINOPHEN 1 TABLET: 5; 325 TABLET ORAL at 12:19

## 2020-08-25 RX ADMIN — HYDROCODONE BITARTRATE AND ACETAMINOPHEN 1 TABLET: 5; 325 TABLET ORAL at 09:00

## 2020-08-25 RX ADMIN — VERAPAMIL HYDROCHLORIDE 120 MG: 80 TABLET, FILM COATED ORAL at 20:50

## 2020-08-25 RX ADMIN — ENOXAPARIN SODIUM 40 MG: 40 INJECTION SUBCUTANEOUS at 09:00

## 2020-08-25 RX ADMIN — FERROUS SULFATE TAB 325 MG (65 MG ELEMENTAL FE) 325 MG: 325 (65 FE) TAB at 09:01

## 2020-08-25 RX ADMIN — CYCLOBENZAPRINE 10 MG: 10 TABLET, FILM COATED ORAL at 18:08

## 2020-08-25 RX ADMIN — SODIUM CHLORIDE: 9 INJECTION, SOLUTION INTRAVENOUS at 15:13

## 2020-08-25 RX ADMIN — ATENOLOL 100 MG: 50 TABLET ORAL at 09:09

## 2020-08-25 RX ADMIN — FERROUS SULFATE TAB 325 MG (65 MG ELEMENTAL FE) 325 MG: 325 (65 FE) TAB at 18:08

## 2020-08-25 ASSESSMENT — PAIN DESCRIPTION - ORIENTATION
ORIENTATION: LEFT;RIGHT
ORIENTATION: RIGHT;LEFT
ORIENTATION: RIGHT;LEFT

## 2020-08-25 ASSESSMENT — PAIN DESCRIPTION - LOCATION
LOCATION: BACK

## 2020-08-25 ASSESSMENT — PAIN DESCRIPTION - PAIN TYPE
TYPE: ACUTE PAIN

## 2020-08-25 ASSESSMENT — PAIN SCALES - GENERAL
PAINLEVEL_OUTOF10: 9
PAINLEVEL_OUTOF10: 7
PAINLEVEL_OUTOF10: 5
PAINLEVEL_OUTOF10: 5
PAINLEVEL_OUTOF10: 8
PAINLEVEL_OUTOF10: 8
PAINLEVEL_OUTOF10: 9
PAINLEVEL_OUTOF10: 7
PAINLEVEL_OUTOF10: 8
PAINLEVEL_OUTOF10: 7
PAINLEVEL_OUTOF10: 9
PAINLEVEL_OUTOF10: 0
PAINLEVEL_OUTOF10: 8

## 2020-08-25 NOTE — PROGRESS NOTES
Pt assisted back to bed from chair. A&o x4. resp even/unlabored on ra, no sob. No cp/pressure, tele reading NSR. Pt c/o pain in mid back/side, medicated per mar. IVF infusing. Fall precautions in place. Bed alarm in place.  Bed low, call bell in reach, instructed to call for assist.

## 2020-08-25 NOTE — CARE COORDINATION
Received a call from Jorgito Nguyen at [facility] stating that they will accept the even with a pending Covid-19 test results. The order for covid- 19 has been put in. RN informed and Dr [facility] and patient updated. Will continue to follow.

## 2020-08-25 NOTE — PROGRESS NOTES
Pt given mom for constipation. Pt c/o 9/10 pain even after flexeril and norco, md notified, pain meds changed to percocet, pt medicated. Incentive spirometer given to pt and pt educated on it.  Neuro sx consult received from Dr. Lyric Washburn.

## 2020-08-25 NOTE — PROGRESS NOTES
Progress Note - Dr. Harris Ji - Internal Medicine  PCP: Lydia Nguyen MD [facility] 75 / [address] [facility] [address] 1000 [facility] Day: 2  Code Status: Full Code  Current Diet: DIET CARB CONTROL;        CC: follow up on medical issues    Subjective:   Jennifer Mo is a 72 y.o. female. She denies problems    Still with some pain but maybe better today  PTrec snf    She denies chest pain, denies shortness of breath, denies nausea,  denies emesis. 10 system Review of Systems is reviewed with patient, and pertinent positives are noted in HPI above . Otherwise, Review of systems is negative. I have reviewed the patient's medical and social history in detail and updated the computerized patient record. To recap: She  has a past medical history of Colon polyps, Constipation, Diabetes mellitus ([facility].), Hyperlipidemia, and Hypertension. . She  has a past surgical history that includes  section; Upper gastrointestinal endoscopy (N/A, 2019); Upper gastrointestinal endoscopy (N/A, 2019); Colonoscopy (2019); Colonoscopy (N/A, 2019); Upper gastrointestinal endoscopy (N/A, 2020); Upper gastrointestinal endoscopy (N/A, 2020); and Upper gastrointestinal endoscopy (2020). . She  reports that she has never smoked. She has never used smokeless tobacco. She reports current alcohol use of about 18.0 standard drinks of alcohol per week. She reports that she does not use drugs. .        Active Hospital Problems    Diagnosis Date Noted    High cholesterol [E78.00] 2020    DM2 (diabetes mellitus, type 2) ([facility] [E11.9] 2020    Hyponatremia [E87.1] 2020    Closed T12 fracture ([facility] [S22.089A] 2020       Current Facility-Administered Medications: linaclotide (LINZESS) capsule 290 mcg - PATIENT SUPPLIED, 290 mcg, Oral, QAM AC  [START ON 2020] metFORMIN (GLUCOPHAGE) tablet 500 mg, 500 mg, Oral, BID WC  verapamil (CALAN) tablet 120 mg, 120 mg, Oral, BID  ferrous sulfate (IRON 325) tablet 325 mg, 325 mg, Oral, TID WC  atorvastatin (LIPITOR) tablet 10 mg, 10 mg, Oral, Nightly  atenolol (TENORMIN) tablet 100 mg, 100 mg, Oral, Daily  0.9 % sodium chloride infusion, , Intravenous, Continuous  sodium chloride flush 0.9 % injection 10 mL, 10 mL, Intravenous, 2 times per day  sodium chloride flush 0.9 % injection 10 mL, 10 mL, Intravenous, PRN  potassium chloride (KLOR-CON M) extended release tablet 40 mEq, 40 mEq, Oral, PRN **OR** potassium bicarb-citric acid (EFFER-K) effervescent tablet 40 mEq, 40 mEq, Oral, PRN **OR** potassium chloride 10 mEq/100 mL IVPB (Peripheral Line), 10 mEq, Intravenous, PRN  acetaminophen (TYLENOL) tablet 650 mg, 650 mg, Oral, Q6H PRN **OR** acetaminophen (TYLENOL) suppository 650 mg, 650 mg, Rectal, Q6H PRN  magnesium hydroxide (MILK OF MAGNESIA) 400 MG/5ML suspension 30 mL, 30 mL, Oral, Daily PRN  promethazine (PHENERGAN) tablet 12.5 mg, 12.5 mg, Oral, Q6H PRN **OR** ondansetron (ZOFRAN) injection 4 mg, 4 mg, Intravenous, Q6H PRN  famotidine (PEPCID) tablet 20 mg, 20 mg, Oral, BID PRN  enoxaparin (LOVENOX) injection 40 mg, 40 mg, Subcutaneous, Daily  hydrALAZINE (APRESOLINE) injection 10 mg, 10 mg, Intravenous, Q6H PRN  0.9 % sodium chloride bolus, 500 mL, Intravenous, PRN  HYDROcodone-acetaminophen (NORCO) 5-325 MG per tablet 1 tablet, 1 tablet, Oral, Q6H PRN  cyclobenzaprine (FLEXERIL) tablet 10 mg, 10 mg, Oral, TID PRN         Objective:  BP (!) 150/78   Pulse 57   Temp 97.6 °F (36.4 °C) (Oral)   Resp 16   Ht 5' 4\" (1.626 m)   Wt 117 lb 4.6 oz (53.2 kg)   SpO2 95%   BMI 20.13 kg/m²      Patient Vitals for the past 24 hrs:   BP Temp Temp src Pulse Resp SpO2   08/25/20 0415 (!) 150/78 97.6 °F (36.4 °C) Oral 57 16 95 %   08/25/20 0201 -- -- -- 55 -- --   08/25/20 0011 -- -- -- 59 -- --   08/24/20 2311 123/77 97.7 °F (36.5 °C) Oral 61 16 95 %   08/24/20 2000 132/66 98.2 °F (36.8 °C) Oral 54 16 94 %   08/24/20 1617 (!) 158/77 97.3 °F adjustment of the mA/kV was utilized to reduce the radiation dose to as low as reasonably achievable.; CT of the lumbar spine was performed without the administration of intravenous contrast. Multiplanar reformatted images are provided for review. Dose modulation, iterative reconstruction, and/or weight based adjustment of the mA/kV was utilized to reduce the radiation dose to as low as reasonably achievable. COMPARISON: None. HISTORY: ORDERING SYSTEM PROVIDED HISTORY: Progressive abdominal pain following a fall 1930 6 hours ago TECHNOLOGIST PROVIDED HISTORY: Reason for exam:->Progressive abdominal pain following a fall 1930 6 hours ago Additional Contrast?->None Reason for Exam: abd pain after fall Acuity: Acute Type of Exam: Initial; ORDERING SYSTEM PROVIDED HISTORY: Fall TECHNOLOGIST PROVIDED HISTORY: Reason for exam:->Fall Reason for Exam: fall Acuity: Acute Type of Exam: Initial FINDINGS: Lower Chest: There is evidence of old granulomatous disease. No free air noted within the abdomen or pelvis. Organs: Liver, gallbladder, adrenal glands, kidneys, spleen and pancreas are unremarkable in appearance. GI/Bowel: Stomach is unremarkable in appearance. Duodenal diverticulum is noted. No acute abnormality appreciated about the small bowel. There is diverticulosis without evidence of acute diverticulitis. Appendix is visualized and appears normal. Pelvis: Urinary bladder is distended. The uterus and ovaries are unremarkable. No free fluid noted. Peritoneum/Retroperitoneum: The aorta is normal in caliber. No suspicious retroperitoneal adenopathy is noted. Bones/Soft Tissues: Bony pelvis appears intact. CT lumbar spine: Subtle findings are limited by osteopenia. Subtle fracture involving the superior endplate of H40 extending posteriorly noted without significant narrowing of the central canal.  Remainder of the lumbar spine appears intact. No acute intra-abdominal or intrapelvic abnormality noted.  CT thoracic

## 2020-08-25 NOTE — PROGRESS NOTES
Hypomagnesemia, Asymptomatic bacteriuria, and Closed stable burst fracture of twelfth thoracic vertebra, initial encounter [facility]) were also pertinent to this visit. has a past medical history of Colon polyps, Constipation, Diabetes mellitus ([address] Hyperlipidemia, and Hypertension. has a past surgical history that includes  section; Upper gastrointestinal endoscopy (N/A, 2019); Upper gastrointestinal endoscopy (N/A, 2019); Colonoscopy (2019); Colonoscopy (N/A, 2019); Upper gastrointestinal endoscopy (N/A, 2020); Upper gastrointestinal endoscopy (N/A, 2020); and Upper gastrointestinal endoscopy (2020). Restrictions  Restrictions/Precautions  Restrictions/Precautions: Fall Risk(thigh fall risk)  Required Braces or Orthoses?: No  Position Activity Restriction  Other position/activity restrictions: Siobhan Chahal, sustained a T12 compression fracture. Came in due to increasing back pain reports she got dizzy and fell  Subjective   General  Chart Reviewed: Yes  Response To Previous Treatment: Patient with no complaints from previous session. Family / Caregiver Present: No  Subjective  Subjective: pt reported 9/10 back pain that is worse with transferring OOB and transitioning to standing, RN notified, K pad ordered  General Comment  Comments: supine in bed upon arrival with alarm on  Pain Screening  Patient Currently in Pain: Yes  Pain Assessment  Pain Assessment: 0-10  Pain Level: 9  Pain Type: Acute pain  Pain Location: Back  Vital Signs  Patient Currently in Pain: Yes       Orientation  Orientation  Overall Orientation Status: Within Functional Limits        Objective   Bed mobility  Supine to Sit: Stand by assistance  Sit to Supine:  Moderate assistance  Scooting: Stand by assistance  Transfers  Sit to Stand: Stand by assistance  Stand to sit: Stand by assistance  Ambulation  Ambulation?: Yes  Ambulation 1  Surface: level tile  Device: Rolling Walker  Assistance: Stand by assistance  Quality of Gait: decreased step length and talita, steady  Distance: 130'  Stairs/Curb  Stairs?: No(limited by pain)     Balance  Posture: Good  Sitting - Static: Good  Sitting - Dynamic: Good  Standing - Static: Fair;+  Standing - Dynamic: Fair            Comment: Pt toileted with SBA for pericare/clothing management and handwashing. Increased time for transitions due to back pain. Moist heat: K pad ordered and RN notified                 AM-PAC Score  AM-PAC Inpatient Mobility Raw Score : 15 (08/25/20 1104)  AM-PAC Inpatient T-Scale Score : 39.45 (08/25/20 1104)  Mobility Inpatient CMS 0-100% Score: 57.7 (08/25/20 1104)  Mobility Inpatient CMS G-Code Modifier : CK (08/25/20 1104)          Goals  Short term goals  Time Frame for Short term goals: To be met prior to discharge  Short term goal 1: Bed mobility with supervision  Short term goal 2: Sit to/from stand with supervision  Short term goal 3: Ambulate 150 feet with RW and supervision  Short term goal 4: Navigate up/down 1 flight of steps with rail and CGA  Patient Goals   Patient goals : to go home   *no goals met    Plan    Plan  Times per week: 5-7  Times per day: Daily  Current Treatment Recommendations: Strengthening, Functional Mobility Training, Transfer Training, Balance Training, Endurance Training, Gait Training, Stair training, Safety Education & Training, Patient/Caregiver Education & Training  Safety Devices  Type of devices:  All fall risk precautions in place, Bed alarm in place, Call light within reach, Left in bed, Nurse notified  Restraints  Initially in place: No     Therapy Time   Individual Concurrent Group Co-treatment   Time In [facility]         Time Out 1102         Minutes 24         Timed Code Treatment Minutes: [address] [facility], [address] [facility] DPT [id number]

## 2020-08-26 LAB
ANION GAP SERPL CALCULATED.3IONS-SCNC: 8 MMOL/L (ref 3–16)
BASOPHILS ABSOLUTE: 0 K/UL (ref 0–0.2)
BASOPHILS RELATIVE PERCENT: 0.7 %
BUN BLDV-MCNC: 4 MG/DL (ref 7–20)
CALCIUM SERPL-MCNC: 8.6 MG/DL (ref 8.3–10.6)
CHLORIDE BLD-SCNC: 98 MMOL/L (ref 99–110)
CO2: 25 MMOL/L (ref 21–32)
CREAT SERPL-MCNC: <0.5 MG/DL (ref 0.6–1.2)
EOSINOPHILS ABSOLUTE: 0.2 K/UL (ref 0–0.6)
EOSINOPHILS RELATIVE PERCENT: 3.4 %
GFR AFRICAN AMERICAN: >60
GFR NON-AFRICAN AMERICAN: >60
GLUCOSE BLD-MCNC: 106 MG/DL (ref 70–99)
GLUCOSE BLD-MCNC: 132 MG/DL (ref 70–99)
HCT VFR BLD CALC: 31.5 % (ref 36–48)
HEMOGLOBIN: 11.1 G/DL (ref 12–16)
LYMPHOCYTES ABSOLUTE: 1.7 K/UL (ref 1–5.1)
LYMPHOCYTES RELATIVE PERCENT: 31.6 %
MCH RBC QN AUTO: 30.8 PG (ref 26–34)
MCHC RBC AUTO-ENTMCNC: 35.4 G/DL (ref 31–36)
MCV RBC AUTO: 87.1 FL (ref 80–100)
MONOCYTES ABSOLUTE: 0.6 K/UL (ref 0–1.3)
MONOCYTES RELATIVE PERCENT: 10.4 %
NEUTROPHILS ABSOLUTE: 2.9 K/UL (ref 1.7–7.7)
NEUTROPHILS RELATIVE PERCENT: 53.9 %
PDW BLD-RTO: 12.9 % (ref 12.4–15.4)
PERFORMED ON: ABNORMAL
PLATELET # BLD: 231 K/UL (ref 135–450)
PMV BLD AUTO: 7 FL (ref 5–10.5)
POTASSIUM SERPL-SCNC: 4.1 MMOL/L (ref 3.5–5.1)
RBC # BLD: 3.61 M/UL (ref 4–5.2)
SARS-COV-2, NAA: NOT DETECTED
SODIUM BLD-SCNC: 131 MMOL/L (ref 136–145)
WBC # BLD: 5.4 K/UL (ref 4–11)

## 2020-08-26 PROCEDURE — 36415 COLL VENOUS BLD VENIPUNCTURE: CPT

## 2020-08-26 PROCEDURE — 2580000003 HC RX 258: Performed by: INTERNAL MEDICINE

## 2020-08-26 PROCEDURE — 80048 BASIC METABOLIC PNL TOTAL CA: CPT

## 2020-08-26 PROCEDURE — 6370000000 HC RX 637 (ALT 250 FOR IP): Performed by: NURSE PRACTITIONER

## 2020-08-26 PROCEDURE — 6370000000 HC RX 637 (ALT 250 FOR IP): Performed by: INTERNAL MEDICINE

## 2020-08-26 PROCEDURE — 97530 THERAPEUTIC ACTIVITIES: CPT

## 2020-08-26 PROCEDURE — G0378 HOSPITAL OBSERVATION PER HR: HCPCS

## 2020-08-26 PROCEDURE — 96372 THER/PROPH/DIAG INJ SC/IM: CPT

## 2020-08-26 PROCEDURE — 94760 N-INVAS EAR/PLS OXIMETRY 1: CPT

## 2020-08-26 PROCEDURE — 85025 COMPLETE CBC W/AUTO DIFF WBC: CPT

## 2020-08-26 PROCEDURE — 1200000000 HC SEMI PRIVATE

## 2020-08-26 PROCEDURE — 6360000002 HC RX W HCPCS: Performed by: INTERNAL MEDICINE

## 2020-08-26 RX ORDER — LIDOCAINE 4 G/G
1 PATCH TOPICAL DAILY
Status: DISCONTINUED | OUTPATIENT
Start: 2020-08-26 | End: 2020-08-27 | Stop reason: HOSPADM

## 2020-08-26 RX ORDER — ATENOLOL 100 MG/1
100 TABLET ORAL DAILY
Qty: 30 TABLET | Refills: 0 | Status: SHIPPED | OUTPATIENT
Start: 2020-08-26 | End: 2020-09-28 | Stop reason: CLARIF

## 2020-08-26 RX ORDER — OXYCODONE HYDROCHLORIDE AND ACETAMINOPHEN 5; 325 MG/1; MG/1
1 TABLET ORAL EVERY 6 HOURS PRN
Qty: 12 TABLET | Refills: 0 | Status: SHIPPED | OUTPATIENT
Start: 2020-08-26 | End: 2020-08-29

## 2020-08-26 RX ORDER — SODIUM PHOSPHATE, DIBASIC AND SODIUM PHOSPHATE, MONOBASIC 7; 19 G/133ML; G/133ML
1 ENEMA RECTAL
Status: COMPLETED | OUTPATIENT
Start: 2020-08-26 | End: 2020-08-26

## 2020-08-26 RX ORDER — BISACODYL 10 MG
10 SUPPOSITORY, RECTAL RECTAL DAILY PRN
Status: DISCONTINUED | OUTPATIENT
Start: 2020-08-26 | End: 2020-08-27 | Stop reason: HOSPADM

## 2020-08-26 RX ORDER — CYCLOBENZAPRINE HCL 10 MG
10 TABLET ORAL 3 TIMES DAILY PRN
Qty: 33 TABLET | Refills: 0
Start: 2020-08-26 | End: 2020-09-05

## 2020-08-26 RX ADMIN — OXYCODONE HYDROCHLORIDE AND ACETAMINOPHEN 1 TABLET: 5; 325 TABLET ORAL at 00:57

## 2020-08-26 RX ADMIN — FERROUS SULFATE TAB 325 MG (65 MG ELEMENTAL FE) 325 MG: 325 (65 FE) TAB at 12:13

## 2020-08-26 RX ADMIN — SODIUM CHLORIDE: 9 INJECTION, SOLUTION INTRAVENOUS at 00:57

## 2020-08-26 RX ADMIN — VERAPAMIL HYDROCHLORIDE 120 MG: 80 TABLET, FILM COATED ORAL at 09:39

## 2020-08-26 RX ADMIN — CYCLOBENZAPRINE 10 MG: 10 TABLET, FILM COATED ORAL at 16:37

## 2020-08-26 RX ADMIN — VERAPAMIL HYDROCHLORIDE 120 MG: 80 TABLET, FILM COATED ORAL at 21:05

## 2020-08-26 RX ADMIN — ATENOLOL 100 MG: 50 TABLET ORAL at 09:40

## 2020-08-26 RX ADMIN — BISACODYL 10 MG: 10 SUPPOSITORY RECTAL at 12:13

## 2020-08-26 RX ADMIN — SODIUM PHOSPHATE 1 ENEMA: 7; 19 ENEMA RECTAL at 16:47

## 2020-08-26 RX ADMIN — METFORMIN HYDROCHLORIDE 500 MG: 500 TABLET ORAL at 16:37

## 2020-08-26 RX ADMIN — MAGNESIUM CITRATE 296 ML: 1.75 LIQUID ORAL at 14:18

## 2020-08-26 RX ADMIN — FERROUS SULFATE TAB 325 MG (65 MG ELEMENTAL FE) 325 MG: 325 (65 FE) TAB at 16:37

## 2020-08-26 RX ADMIN — METFORMIN HYDROCHLORIDE 500 MG: 500 TABLET ORAL at 09:44

## 2020-08-26 RX ADMIN — OXYCODONE HYDROCHLORIDE AND ACETAMINOPHEN 1 TABLET: 5; 325 TABLET ORAL at 09:39

## 2020-08-26 RX ADMIN — OXYCODONE HYDROCHLORIDE AND ACETAMINOPHEN 1 TABLET: 5; 325 TABLET ORAL at 22:46

## 2020-08-26 RX ADMIN — CYCLOBENZAPRINE 10 MG: 10 TABLET, FILM COATED ORAL at 06:06

## 2020-08-26 RX ADMIN — BISACODYL 10 MG: 5 TABLET, COATED ORAL at 21:05

## 2020-08-26 RX ADMIN — OXYCODONE HYDROCHLORIDE AND ACETAMINOPHEN 1 TABLET: 5; 325 TABLET ORAL at 16:37

## 2020-08-26 RX ADMIN — ENOXAPARIN SODIUM 40 MG: 40 INJECTION SUBCUTANEOUS at 09:39

## 2020-08-26 RX ADMIN — SODIUM CHLORIDE: 9 INJECTION, SOLUTION INTRAVENOUS at 21:07

## 2020-08-26 RX ADMIN — Medication 10 ML: at 21:07

## 2020-08-26 RX ADMIN — FERROUS SULFATE TAB 325 MG (65 MG ELEMENTAL FE) 325 MG: 325 (65 FE) TAB at 09:40

## 2020-08-26 RX ADMIN — ATORVASTATIN CALCIUM 10 MG: 10 TABLET, FILM COATED ORAL at 21:05

## 2020-08-26 ASSESSMENT — PAIN DESCRIPTION - PAIN TYPE
TYPE: ACUTE PAIN

## 2020-08-26 ASSESSMENT — PAIN SCALES - GENERAL
PAINLEVEL_OUTOF10: 10
PAINLEVEL_OUTOF10: 8
PAINLEVEL_OUTOF10: 8
PAINLEVEL_OUTOF10: 6
PAINLEVEL_OUTOF10: 9
PAINLEVEL_OUTOF10: 8
PAINLEVEL_OUTOF10: 7
PAINLEVEL_OUTOF10: 7
PAINLEVEL_OUTOF10: 8
PAINLEVEL_OUTOF10: 8
PAINLEVEL_OUTOF10: 10
PAINLEVEL_OUTOF10: 9

## 2020-08-26 ASSESSMENT — PAIN DESCRIPTION - LOCATION
LOCATION: BACK

## 2020-08-26 ASSESSMENT — PAIN DESCRIPTION - ORIENTATION
ORIENTATION: LOWER

## 2020-08-26 NOTE — PROGRESS NOTES
Physical Therapy  Facility/Department: [facility] 3A NURSING  Daily Treatment Note  NAME: Bernardino Alcantara  : 1950  MRN: [id number]    Date of Service: 2020    Discharge Recommendations:  Bernardino Alcantara scored a 17/24 on the AM-PAC short mobility form. Current research shows that an AM-PAC score of 17 or less is typically not associated with a discharge to the patient's home setting. Based on the patient's AM-PAC score and their current functional mobility deficits, it is recommended that the patient have 3-5 sessions per week of Physical Therapy at d/c to increase the patient's independence. Please see assessment section for further patient specific details. If patient discharges prior to next session this note will serve as a discharge summary. Please see below for the latest assessment towards goals. 3-5 sessions per week   PT Equipment Recommendations  Equipment Needed: No  Other: Needs RW for safe ambulation currently - borrowing one from friend    Assessment   Body structures, Functions, Activity limitations: Decreased functional mobility ; Decreased strength;Decreased safe awareness;Decreased balance  Assessment: Pain affecting overall functional mobility. Using RW for increased ambulation tolerance and relieving some back pain. Continued skilled PT to promote return to PLOF. Treatment Diagnosis: decreased functional mobility, impaired gait, decreased balance  Prognosis: Good  PT Education: Goals;PT Role;Plan of Care;Disease Specific Education  Patient Education: d/c recommendations at length - verbalized understanding  Barriers to Learning: hearing  REQUIRES PT FOLLOW UP: Yes  Activity Tolerance  Activity Tolerance: Patient limited by pain     Patient Diagnosis(es): The primary encounter diagnosis was Hyponatremia.  Diagnoses of Hypokalemia, Hypomagnesemia, Asymptomatic bacteriuria, and Closed stable burst fracture of twelfth thoracic vertebra, initial encounter [facility]) were also pertinent to this visit.     has a past medical history of Colon polyps, Constipation, Diabetes mellitus ([address] Hyperlipidemia, and Hypertension. has a past surgical history that includes  section; Upper gastrointestinal endoscopy (N/A, 2019); Upper gastrointestinal endoscopy (N/A, 2019); Colonoscopy (2019); Colonoscopy (N/A, 2019); Upper gastrointestinal endoscopy (N/A, 2020); Upper gastrointestinal endoscopy (N/A, 2020); and Upper gastrointestinal endoscopy (2020). Restrictions  Restrictions/Precautions  Restrictions/Precautions: Fall Risk(thigh fall risk)  Required Braces or Orthoses?: No  Position Activity Restriction  Other position/activity restrictions: Katrina Verdugo, sustained a T12 compression fracture. Came in due to increasing back pain reports she got dizzy and fell  Subjective   General  Chart Reviewed: Yes  Response To Previous Treatment: Patient with no complaints from previous session.   Family / Caregiver Present: No  Subjective  Subjective: agreed to therapy, reports having had muscle relaxor earlier this morning, denied pain at rest  General Comment  Comments: supine in bed at arrival          Orientation     Cognition      Objective   Bed mobility  Supine to Sit: Stand by assistance  Scooting: Stand by assistance  Comment: pt performed log rolling technique without cueing, HOB slightly elevated and use of bed railing  Transfers  Sit to Stand: Stand by assistance(from EOB and from commode, increased time due to pain during transition)  Stand to sit: Stand by assistance  Ambulation  Ambulation?: Yes  Ambulation 1  Surface: level tile  Device: Rolling Walker  Assistance: Stand by assistance  Quality of Gait: decreased step length and talita, steady  Gait Deviations: Slow Talita  Distance: 150 ft  Comments: back pain throughout ambulation but able to continue, vc for proper usage of RW  Stairs/Curb  Stairs?: No(limited by pain)     Balance  Posture: Good  Sitting - Static: Good  Sitting - Dynamic: Good  Standing - Static: Fair;+  Standing - Dynamic: Fair                           G-Code     OutComes Score                                                     AM-PAC Score             Goals-- no goals met   Short term goals  Time Frame for Short term goals: To be met prior to discharge  Short term goal 1: Bed mobility with supervision  Short term goal 2: Sit to/from stand with supervision  Short term goal 3: Ambulate 150 feet with RW and supervision  Short term goal 4: Navigate up/down 1 flight of steps with rail and CGA  Patient Goals   Patient goals : to go home    Plan    Plan  Times per week: 5-7  Times per day: Daily  Current Treatment Recommendations: Strengthening, Functional Mobility Training, Transfer Training, Balance Training, Endurance Training, Gait Training, Stair training, Safety Education & Training, Patient/Caregiver Education & Training  Safety Devices  Type of devices:  All fall risk precautions in place, Call light within reach, Chair alarm in place, Nurse notified, Left in chair  Restraints  Initially in place: No     Therapy Time   Individual Concurrent Group Co-treatment   Time In 0816         Time Out 0839         Minutes 23         Timed Code Treatment Minutes: Shaun 57, [id number]

## 2020-08-26 NOTE — PROGRESS NOTES
Transport cancelled per MD due to no BM. Orders received for tap water enema. Brian notified pt will not being transferred there tonight per md orders.

## 2020-08-26 NOTE — PROGRESS NOTES
Progress Note - Dr. Job Calzada - Internal Medicine  PCP: Lorna Pruett MD [address] / [address] [facility] [id number] [address] [facility] Day: 3  Code Status: Full Code  Current Diet: DIET CARB CONTROL;        CC: follow up on medical issues    Subjective:   Philip Gitelman is a 72 y.o. female. She denies problems     pain better today  PT rec snf - working on [employer] requested neurosurg eval - consult pending  CT lumbar spine demonstrates superior endplate fracture at B45 with minimal loss of height and retropulsion. Think that any sort of intervention unlikely, but of course, will defer to decision of specialist  Pain controlled with oral percocet    She denies chest pain, denies shortness of breath, denies nausea,  denies emesis. 10 system Review of Systems is reviewed with patient, and pertinent positives are noted in HPI above . Otherwise, Review of systems is negative. I have reviewed the patient's medical and social history in detail and updated the computerized patient record. To recap: She  has a past medical history of Colon polyps, Constipation, Diabetes mellitus ([facility] Hyperlipidemia, and Hypertension. . She  has a past surgical history that includes  section; Upper gastrointestinal endoscopy (N/A, 2019); Upper gastrointestinal endoscopy (N/A, 2019); Colonoscopy (2019); Colonoscopy (N/A, 2019); Upper gastrointestinal endoscopy (N/A, 2020); Upper gastrointestinal endoscopy (N/A, 2020); and Upper gastrointestinal endoscopy (2020). . She  reports that she has never smoked. She has never used smokeless tobacco. She reports current alcohol use of about 18.0 standard drinks of alcohol per week. She reports that she does not use drugs. .        Active Hospital Problems    Diagnosis Date Noted    High cholesterol [E78.00] 2020    DM2 (diabetes mellitus, type 2) ([address] [E11.9] 2020    Hyponatremia [E87.1] 2020    Closed T12 fracture [facility]) [S22.089A] 08/23/2020       Current Facility-Administered Medications: oxyCODONE-acetaminophen (PERCOCET) 5-325 MG per tablet 1 tablet, 1 tablet, Oral, Q6H PRN  linaclotide (LINZESS) capsule 290 mcg - PATIENT SUPPLIED, 290 mcg, Oral, QAM AC  metFORMIN (GLUCOPHAGE) tablet 500 mg, 500 mg, Oral, BID WC  verapamil (CALAN) tablet 120 mg, 120 mg, Oral, BID  ferrous sulfate (IRON 325) tablet 325 mg, 325 mg, Oral, TID WC  atorvastatin (LIPITOR) tablet 10 mg, 10 mg, Oral, Nightly  atenolol (TENORMIN) tablet 100 mg, 100 mg, Oral, Daily  0.9 % sodium chloride infusion, , Intravenous, Continuous  sodium chloride flush 0.9 % injection 10 mL, 10 mL, Intravenous, 2 times per day  sodium chloride flush 0.9 % injection 10 mL, 10 mL, Intravenous, PRN  potassium chloride (KLOR-CON M) extended release tablet 40 mEq, 40 mEq, Oral, PRN **OR** potassium bicarb-citric acid (EFFER-K) effervescent tablet 40 mEq, 40 mEq, Oral, PRN **OR** potassium chloride 10 mEq/100 mL IVPB (Peripheral Line), 10 mEq, Intravenous, PRN  acetaminophen (TYLENOL) tablet 650 mg, 650 mg, Oral, Q6H PRN **OR** acetaminophen (TYLENOL) suppository 650 mg, 650 mg, Rectal, Q6H PRN  magnesium hydroxide (MILK OF MAGNESIA) 400 MG/5ML suspension 30 mL, 30 mL, Oral, Daily PRN  promethazine (PHENERGAN) tablet 12.5 mg, 12.5 mg, Oral, Q6H PRN **OR** ondansetron (ZOFRAN) injection 4 mg, 4 mg, Intravenous, Q6H PRN  famotidine (PEPCID) tablet 20 mg, 20 mg, Oral, BID PRN  enoxaparin (LOVENOX) injection 40 mg, 40 mg, Subcutaneous, Daily  hydrALAZINE (APRESOLINE) injection 10 mg, 10 mg, Intravenous, Q6H PRN  0.9 % sodium chloride bolus, 500 mL, Intravenous, PRN  cyclobenzaprine (FLEXERIL) tablet 10 mg, 10 mg, Oral, TID PRN         Objective:  BP (!) 155/79   Pulse 56   Temp 97.6 °F (36.4 °C) (Oral)   Resp 16   Ht 5' 4\" (1.626 m)   Wt 126 lb 12.8 oz (57.5 kg)   SpO2 93%   BMI 21.77 kg/m²      Patient Vitals for the past 24 hrs:   BP Temp Temp src Pulse Resp SpO2 Weight   08/26/20 0600 -- -- -- -- -- -- 126 lb 12.8 oz (57.5 kg)   08/26/20 0415 (!) 155/79 97.6 °F (36.4 °C) Oral 56 16 93 % --   08/26/20 0353 -- -- -- 55 -- -- --   08/26/20 0000 134/77 98.1 °F (36.7 °C) Oral 59 16 96 % --   08/25/20 2045 (!) 149/71 97.9 °F (36.6 °C) Oral 57 16 99 % --   08/25/20 1556 135/62 97.5 °F (36.4 °C) Oral 57 16 100 % --   08/25/20 1430 114/78 97.5 °F (36.4 °C) Oral 62 16 96 % --   08/25/20 1130 122/65 98.3 °F (36.8 °C) Oral 60 16 97 % --     Patient Vitals for the past 96 hrs (Last 3 readings):   Weight   08/26/20 0600 126 lb 12.8 oz (57.5 kg)   08/23/20 2256 117 lb 4.6 oz (53.2 kg)   08/23/20 1528 124 lb (56.2 kg)           Intake/Output Summary (Last 24 hours) at 8/26/2020 2655  Last data filed at 8/26/2020 1965  Gross per 24 hour   Intake 940 ml   Output 2900 ml   Net -1960 ml         Physical Exam:   BP (!) 155/79   Pulse 56   Temp 97.6 °F (36.4 °C) (Oral)   Resp 16   Ht 5' 4\" (1.626 m)   Wt 126 lb 12.8 oz (57.5 kg)   SpO2 93%   BMI 21.77 kg/m²   General appearance: alert, appears stated age and cooperative  Head: Normocephalic, without obvious abnormality, atraumatic  Lungs: clear to auscultation bilaterally  Heart: regular rate and rhythm, S1, S2 normal, no murmur, click, rub or gallop  Abdomen: soft, non-tender; bowel sounds normal; no masses,  no organomegaly  Extremities: extremities normal, atraumatic, no cyanosis or edema    Labs:  Lab Results   Component Value Date    WBC 5.4 08/26/2020    HGB 11.1 (L) 08/26/2020    HCT 31.5 (L) 08/26/2020     08/26/2020    CHOL 191 01/13/2015    TRIG 179 (A) 01/13/2015    HDL 64 (A) 01/13/2015    ALT 13 08/24/2020    AST 14 (L) 08/24/2020     (L) 08/26/2020    K 4.1 08/26/2020    CL 98 (L) 08/26/2020    CREATININE <0.5 (L) 08/26/2020    BUN 4 (L) 08/26/2020    CO2 25 08/26/2020    TSH 1.890 01/13/2015    INR 1.03 08/23/2020    LABA1C 5.6 09/29/2016    LABMICR YES 08/23/2020     No results found for: CKTOTAL, CKMB, CKMBINDEX, TROPONINI    Recent Imaging Results are Reviewed:  Ct Thoracic Spine Wo Contrast    Result Date: 8/23/2020  EXAMINATION: CT OF THE THORACIC SPINE WITHOUT CONTRAST  8/23/2020 7:31 pm: TECHNIQUE: CT of the thoracic spine was performed without the administration of intravenous contrast. Multiplanar reformatted images are provided for review. Dose modulation, iterative reconstruction, and/or weight based adjustment of the mA/kV was utilized to reduce the radiation dose to as low as reasonably achievable. COMPARISON: None. HISTORY: ORDERING SYSTEM PROVIDED HISTORY: Fall TECHNOLOGIST PROVIDED HISTORY: Reason for exam:->Fall Reason for Exam: fall Acuity: Acute Type of Exam: Initial FINDINGS: BONES/ALIGNMENT: There is normal alignment of the spine. There is a chronic compression fracture of T10 as well as an acute superior endplate fracture of J14 resulting in mild loss of vertebral body height. There is a minimal amount of retropulsed bone, without significant osseous canal narrowing. No osseous destructive lesion is seen. DEGENERATIVE CHANGES: No gross spinal canal stenosis or bony neural foraminal narrowing of the thoracic spine. SOFT TISSUES: No paraspinal mass is seen. Acute burst type fracture of T12, resulting in mild loss of vertebral body height. Ct Lumbar Spine Wo Contrast    Result Date: 8/23/2020  EXAMINATION: CT OF THE ABDOMEN AND PELVIS WITH CONTRAST; CT OF THE LUMBAR SPINE WITHOUT CONTRAST 8/23/2020 7:32 pm; 8/23/2020 7:31 pm TECHNIQUE: CT of the abdomen and pelvis was performed with the administration of intravenous contrast. Multiplanar reformatted images are provided for review. Dose modulation, iterative reconstruction, and/or weight based adjustment of the mA/kV was utilized to reduce the radiation dose to as low as reasonably achievable.; CT of the lumbar spine was performed without the administration of intravenous contrast. Multiplanar reformatted images are provided for review. Dose modulation, iterative reconstruction, and/or weight based adjustment of the mA/kV was utilized to reduce the radiation dose to as low as reasonably achievable. COMPARISON: None. HISTORY: ORDERING SYSTEM PROVIDED HISTORY: Progressive abdominal pain following a fall 1930 6 hours ago TECHNOLOGIST PROVIDED HISTORY: Reason for exam:->Progressive abdominal pain following a fall 1930 6 hours ago Additional Contrast?->None Reason for Exam: abd pain after fall Acuity: Acute Type of Exam: Initial; ORDERING SYSTEM PROVIDED HISTORY: Fall TECHNOLOGIST PROVIDED HISTORY: Reason for exam:->Fall Reason for Exam: fall Acuity: Acute Type of Exam: Initial FINDINGS: Lower Chest: There is evidence of old granulomatous disease. No free air noted within the abdomen or pelvis. Organs: Liver, gallbladder, adrenal glands, kidneys, spleen and pancreas are unremarkable in appearance. GI/Bowel: Stomach is unremarkable in appearance. Duodenal diverticulum is noted. No acute abnormality appreciated about the small bowel. There is diverticulosis without evidence of acute diverticulitis. Appendix is visualized and appears normal. Pelvis: Urinary bladder is distended. The uterus and ovaries are unremarkable. No free fluid noted. Peritoneum/Retroperitoneum: The aorta is normal in caliber. No suspicious retroperitoneal adenopathy is noted. Bones/Soft Tissues: Bony pelvis appears intact. CT lumbar spine: Subtle findings are limited by osteopenia. Subtle fracture involving the superior endplate of U37 extending posteriorly noted without significant narrowing of the central canal.  Remainder of the lumbar spine appears intact. No acute intra-abdominal or intrapelvic abnormality noted. CT thoracic spine reported separately. CT lumbar spine demonstrates superior endplate fracture at H19 with minimal loss of height and retropulsion. No significant narrowing of the neural canal noted. Subtle findings are limited by underlying osteopenia.      Ct Abdomen Pelvis W Iv Contrast Additional Contrast? None    Result Date: 8/23/2020  EXAMINATION: CT OF THE ABDOMEN AND PELVIS WITH CONTRAST; CT OF THE LUMBAR SPINE WITHOUT CONTRAST 8/23/2020 7:32 pm; 8/23/2020 7:31 pm TECHNIQUE: CT of the abdomen and pelvis was performed with the administration of intravenous contrast. Multiplanar reformatted images are provided for review. Dose modulation, iterative reconstruction, and/or weight based adjustment of the mA/kV was utilized to reduce the radiation dose to as low as reasonably achievable.; CT of the lumbar spine was performed without the administration of intravenous contrast. Multiplanar reformatted images are provided for review. Dose modulation, iterative reconstruction, and/or weight based adjustment of the mA/kV was utilized to reduce the radiation dose to as low as reasonably achievable. COMPARISON: None. HISTORY: ORDERING SYSTEM PROVIDED HISTORY: Progressive abdominal pain following a fall 1930 6 hours ago TECHNOLOGIST PROVIDED HISTORY: Reason for exam:->Progressive abdominal pain following a fall 1930 6 hours ago Additional Contrast?->None Reason for Exam: abd pain after fall Acuity: Acute Type of Exam: Initial; ORDERING SYSTEM PROVIDED HISTORY: Fall TECHNOLOGIST PROVIDED HISTORY: Reason for exam:->Fall Reason for Exam: fall Acuity: Acute Type of Exam: Initial FINDINGS: Lower Chest: There is evidence of old granulomatous disease. No free air noted within the abdomen or pelvis. Organs: Liver, gallbladder, adrenal glands, kidneys, spleen and pancreas are unremarkable in appearance. GI/Bowel: Stomach is unremarkable in appearance. Duodenal diverticulum is noted. No acute abnormality appreciated about the small bowel. There is diverticulosis without evidence of acute diverticulitis. Appendix is visualized and appears normal. Pelvis: Urinary bladder is distended. The uterus and ovaries are unremarkable. No free fluid noted.  Peritoneum/Retroperitoneum: The aorta is

## 2020-08-26 NOTE — CONSULTS
Neurosurgery Consult Note    Reason for Consult: T12 fx  Requesting Provider: Dr. Mayur Leonard:  [address] [facility] / HPI:  Rajat Gross is a 72 y.o. female patient being seen for complaints of back pain s/p fall while going to  early Saturday am.  Thoracolumbar back pain worse with movement or transitioning positions. Denies any arm or leg pain, numbness or weakness. Past Medical History:   Diagnosis Date    Colon polyps     Constipation     Diabetes mellitus ([address]     Hyperlipidemia     Hypertension      Past Surgical History:   Procedure Laterality Date     SECTION      COLONOSCOPY  2019    COLONOSCOPY POLYPECTOMY SNARE/COLD BIOPSY performed by Laquita Cortés MD at [address] [facility] N/A 2019    COLONOSCOPY POLYPECTOMY SNARE/COLD BIOPSY performed by Laquita Cortés MD at [place] [address] N/A 2019    EGD BIOPSY performed by Laquita Cortés MD at [place] [address] N/A 2019    EGD DILATION BALLOON performed by Laquita Cortés MD at [place] [address] N/ 2020    EGD WITH BALLOON  DILATION performed by aLquita Cortés MD at [place] [address] 2020    EGD BIOPSY performed by Laquita Cortés MD at [place] [address]  2020    ESOPHAGEAL CAPSULE ENDOSCOPY performed by Laquita Cortés MD at [address] [place] [address]     Patient has no known allergies.     Current Facility-Administered Medications:     oxyCODONE-acetaminophen (PERCOCET) 5-325 MG per tablet 1 tablet, 1 tablet, Oral, Q6H PRN, Joseph Terrell MD, 1 tablet at 20 0057    linaclotide (LINZESS) capsule 290 mcg - PATIENT SUPPLIED, 290 mcg, Oral, QAM AC, Joseph Terrell MD, 290 mcg at 20 0606    metFORMIN (GLUCOPHAGE) tablet 500 mg, 500 mg, Oral, BID WC, Joseph Terrell MD    verapamil Manual Luz Maria) VLADIMIR CARDOSO, Adore Birch MD, 10 mg at 08/26/20 0606  Social History     Socioeconomic History    Marital status: Single     Spouse name: Not on file    Number of children: 2    Years of education: Not on file    Highest education level: Not on file   Occupational History    Occupation: retired from [address] resource strain: Not on file    Food insecurity     Worry: Not on file     Inability: Not on file   [employer]eng[employer] needs     Medical: Not on file     Non-medical: Not on file   Tobacco Use    Smoking status: Never Smoker    Smokeless tobacco: Never Used   Substance and Sexual Activity    Alcohol use: Yes     Alcohol/week: 18.0 standard drinks     Types: 12 Standard drinks or equivalent, 6 Cans of beer per week     Comment: weekly    Drug use: No    Sexual activity: Not Currently   Lifestyle    Physical activity     Days per week: Not on file     Minutes per session: Not on file    Stress: Not on file   Relationships    Social connections     Talks on phone: Not on file     Gets together: Not on file     Attends Zoroastrianism service: Not on file     Active member of club or organization: Not on file     Attends meetings of clubs or organizations: Not on file     Relationship status: Not on file    Intimate partner violence     Fear of current or ex partner: Not on file     Emotionally abused: Not on file     Physically abused: Not on file     Forced sexual activity: Not on file   Other Topics Concern    Not on file   Social History Narrative    Not on file     Family History   Problem Relation Age of Onset    Cancer Mother     Asthma Father     Heart Attack Brother        ROS: Complete 10 point ROS was obtained with positives in HPI, otherwise:  Pt denies fevers, denies chills. Pt denies chest pain, denies SOB, denies nausea, denies vomiting, denies headache.       PHYSICAL EXAMINATION:  BP (!) 155/79   Pulse 56   Temp 97.6 °F (36.4 °C) (Oral)   Resp 16   Ht 5' 4\" (1.626 m)   Wt 126 lb 12.8 oz (57.5 kg)   SpO2 93%   BMI 21.77 kg/m²   GENERAL:  alert and cooperative  HEENT:  sclera clear and neck supple  NEUROLOGIC:  Awake, alert, oriented to name, place and time. Cranial nerves II-XII are grossly intact. Motor is 5 out of 5 bilaterally. Cerebellar finger to nose, heel to shin intact. Sensory is intact.   Babinski down going, Romberg negative, and gait is normal.      LABORATORY DATA:   CBC with Differential:    Lab Results   Component Value Date    WBC 5.4 08/26/2020    RBC 3.61 08/26/2020    HGB 11.1 08/26/2020    HCT 31.5 08/26/2020     08/26/2020    MCV 87.1 08/26/2020    MCH 30.8 08/26/2020    MCHC 35.4 08/26/2020    RDW 12.9 08/26/2020    SEGSPCT 74 01/13/2015    LYMPHOPCT 31.6 08/26/2020    MONOPCT 10.4 08/26/2020    BASOPCT 0.7 08/26/2020    MONOSABS 0.6 08/26/2020    LYMPHSABS 1.7 08/26/2020    EOSABS 0.2 08/26/2020    BASOSABS 0.0 08/26/2020     CMP:    Lab Results   Component Value Date     08/26/2020    K 4.1 08/26/2020    K 3.4 08/24/2020    CL 98 08/26/2020    CO2 25 08/26/2020    BUN 4 08/26/2020    CREATININE <0.5 08/26/2020    GFRAA >60 08/26/2020    AGRATIO 1.6 08/24/2020    LABGLOM >60 08/26/2020    GLUCOSE 106 08/26/2020    GLUCOSE 114 01/13/2015    PROT 6.7 08/24/2020    LABALBU 4.1 08/24/2020    CALCIUM 8.6 08/26/2020    BILITOT 0.7 08/24/2020    BILITOT 0.3 01/13/2015    ALKPHOS 68 08/24/2020    AST 14 08/24/2020    ALT 13 08/24/2020        IMAGING STUDIES:   CT of the Thoracic-Spine:  Result:   FINDINGS:    BONES/ALIGNMENT: There is normal alignment of the spine.  There is a chronic    compression fracture of T10 as well as an acute superior endplate fracture of    T12 resulting in mild loss of vertebral body height.  There is a minimal    amount of retropulsed bone, without significant osseous canal narrowing.  No    osseous destructive lesion is seen.         DEGENERATIVE CHANGES: No gross spinal canal stenosis or

## 2020-08-26 NOTE — PLAN OF CARE
Problem: Pain:  Goal: Control of acute pain  Description: Control of acute pain  8/26/2020 1912 by Feliz Gómez RN  Outcome: Ongoing  8/26/2020 1912 by Feliz Gómez RN  Outcome: Ongoing  Intervention: Opioid analgesia side-effects  Description: Opioid analgesia side-effects - REMINDER(s):  Bradycardia. Confusion. Constipation. Respiratory function, decreased. Note: Pt falls asleep after pain pill, when wakes up still has 8/10 pain. Heating pad in place. Back brace on when OOB. Pt has constipation, medicated per orders, encouraged fluids.

## 2020-08-26 NOTE — PROGRESS NOTES
After no BM after dulcolax suppository/magnesium citrate, pt given a fleets enema at this time per orders. Will cont to monitor.

## 2020-08-26 NOTE — CARE COORDINATION
Talked to Jorgito Magdaleno 141- admissions at [facility], stating that they will accept the patient even with a pending Covid- 19 but the results are back and - negative. Also talked to her daughter - Kali Shipley. [facility]- ETA  6.00pm. HIMA completed and HENS submitted. ALCON Bacon updated .

## 2020-08-26 NOTE — DISCHARGE INSTR - COC
Continuity of Care Form    Patient Name: Calderon Lanier   :  1950  MRN:  [id number]    Admit date:  2020  Discharge date:  20    Code Status Order: Full Code   Advance Directives:   Advance Care Flowsheet Documentation     Date/Time Healthcare Directive Type of Healthcare Directive Copy in [address] Agent's Name Healthcare Agent's Phone Number    20 [phone]  No, patient does not have an advance directive for healthcare treatment -- -- -- -- --          Admitting Physician:  Mell Ramirez MD  PCP: Refugia Meckel, MD    Discharging Nurse: [facility] Unit/Room#: 5KW-2122/5015-40  Discharging Unit Phone Number: [phone]    Emergency Contact:   Extended Emergency Contact Information  Primary Emergency Contact:  Suhas Arnett of [address] [place] [address] Phone: [phone]  Relation: Child  Secondary Emergency Contact: Baylee May   [facility] of [address] [place] [address] Phone: [phone]  Relation: Other    Past Surgical History:  Past Surgical History:   Procedure Laterality Date     SECTION      COLONOSCOPY  2019    COLONOSCOPY POLYPECTOMY SNARE/COLD BIOPSY performed by Tanya Mulligan MD at [facility] N/A 2019    COLONOSCOPY POLYPECTOMY SNARE/COLD BIOPSY performed by Tanya Mulligan MD at [address] [place] [address] N/A 2019    EGD BIOPSY performed by Tanya Mulligan MD at [address] [place] [address] N/A 2019    EGD DILATION BALLOON performed by Tanya Mulligan MD at [address] [place] [address] N/A 2020    EGD WITH BALLOON  DILATION performed by Tanya Mulligan MD at [address] [place] [address] 2020    EGD BIOPSY performed by Tanya Mulligan MD at [address] [place] [address]  2020    ESOPHAGEAL CAPSULE ENDOSCOPY performed by Primo Liao MD at [address] [facility] ENDOSCOPY       Immunization History: There is no immunization history on file for this patient. Active Problems:  Patient Active Problem List   Diagnosis Code    Essential hypertension, malignant I10    Type II or unspecified type diabetes mellitus without mention of complication, not stated as uncontrolled E11.9    Chronic idiopathic constipation K59.04    Hyponatremia E87.1    Closed T12 fracture ([facility] S22.089A    High cholesterol E78.00    DM2 (diabetes mellitus, type 2) ([facility] E11.9       Isolation/Infection:   Isolation          No Isolation        Patient Infection Status     Infection Onset Added Last Indicated Last Indicated By Review Planned Expiration Resolved Resolved By    COVID-19 Rule Out 08/25/20 08/25/20 08/25/20 COVID-19 (Ordered) 09/01/20 09/08/20            Nurse Assessment:  Last Vital Signs: BP (!) 155/79   Pulse 56   Temp 97.6 °F (36.4 °C) (Oral)   Resp 16   Ht 5' 4\" (1.626 m)   Wt 126 lb 12.8 oz (57.5 kg)   SpO2 93%   BMI 21.77 kg/m²     Last documented pain score (0-10 scale): Pain Level: 10  Last Weight:   Wt Readings from Last 1 Encounters:   08/26/20 126 lb 12.8 oz (57.5 kg)     Mental Status:  oriented and alert    IV Access:  - None    Nursing Mobility/ADLs:  Walking   Assisted  Transfer  Assisted  Bathing  Assisted  Dressing  Assisted  Toileting  Assisted  Feeding  Independent  Med Admin  Independent  Med Delivery   whole    Wound Care Documentation and Therapy:        Elimination:  Continence:   · Bowel: Yes  · Bladder: Yes  Urinary Catheter: None   Colostomy/Ileostomy/Ileal Conduit: No       Date of Last BM: 8/27/2020    Intake/Output Summary (Last 24 hours) at 8/26/2020 0825  Last data filed at 8/26/2020 0614  Gross per 24 hour   Intake 940 ml   Output 2900 ml   Net -1960 ml     I/O last 3 completed shifts:   In: 1180 [P.O.:1180]  Out: 3700 [Urine:3700]    Safety Concerns:     History of Falls (last 30 days) and At Risk for Falls    Impairments/Disabilities:      Hearing    Nutrition Therapy:  Current Nutrition Therapy:   - Oral Diet:  Carb Control 4 carbs/meal (1800kcals/day)    Routes of Feeding: Oral  Liquids: Thin Liquids  Daily Fluid Restriction: no  Last Modified Barium Swallow with Video (Video Swallowing Test): not done    Treatments at the Time of Hospital Discharge:   Respiratory Treatments: n/a  Oxygen Therapy:  is not on home oxygen therapy. Ventilator:    - No ventilator support    Rehab Therapies: Physical Therapy, Occupational Therapy and Orthotics/Prosthetics (back brace)  Weight Bearing Status/Restrictions: No weight bearing restirctions  Other Medical Equipment (for information only, NOT a DME order):  walker, bedside commode and braces (Back brace)  Other Treatments: pt to wear back brace when getting out of bed  Transport pt to [facility] on 8/31/20 for outpt Dexa bone density test and also transport pt to [facility] on 9/1/20 for outpt lumbar spine xray.     Patient's personal belongings (please select all that are sent with patient):  Glasses, Dentures upper    RN SIGNATURE:  Electronically signed by Nasim Sales RN on 8/27/20 at 12:17 PM EDT    CASE MANAGEMENT/SOCIAL WORK SECTION    Inpatient Status Date: 8/26/20    Readmission Risk Assessment Score:  Readmission Risk              Risk of Unplanned Readmission:        12           Discharging to Facility/ Agency   Name:   [facility]  PHONE: [phone]  · FAX: [phone]  · Address:  · Phone:  · Fax:    Dialysis Facility (if applicable)   · Name:  · Address:  · Dialysis Schedule:  · Phone:  · Fax:    / signature: Electronically signed by Jose Harp RN on 8/26/20 at 12:42 PM EDT    PHYSICIAN SECTION    Prognosis: Fair    Condition at Discharge: Stable    Rehab Potential (if transferring to Rehab): Good    Recommended Labs or Other Treatments After Discharge: cbc, bmp in 1wk    Physician Certification: I certify the above information and transfer of Fabian Layton  is necessary for the continuing treatment of the diagnosis listed and that she requires [facility] for greater 30 days.      Update Admission H&P: No change in H&P    PHYSICIAN SIGNATURE:  Electronically signed by Lyndon Lee MD on 8/26/20 at 8:25 AM EDT

## 2020-08-26 NOTE — CARE COORDINATION
Discharge Plan:     Patient discharged to:  [facility]  PHONE: [phone]  FAX: [phone]  SW/DC Planner faxed, [address] and [place] to: [phone]  Narcotic Prescriptions faxed were:  Yes Percocet  5/325mg  RN: Aidee Ortiz  will call report to:     [phone]  Medical Transport with: [facility]   time: 6 pm  Family advised of discharge?: Yes  Daughter  Nori Eason?:    Yes  All discharge needs met per case management.

## 2020-08-27 VITALS
TEMPERATURE: 97.3 F | DIASTOLIC BLOOD PRESSURE: 67 MMHG | BODY MASS INDEX: 21.82 KG/M2 | RESPIRATION RATE: 16 BRPM | OXYGEN SATURATION: 97 % | SYSTOLIC BLOOD PRESSURE: 132 MMHG | HEIGHT: 64 IN | HEART RATE: 69 BPM | WEIGHT: 127.8 LBS

## 2020-08-27 LAB
ANION GAP SERPL CALCULATED.3IONS-SCNC: 7 MMOL/L (ref 3–16)
BASOPHILS ABSOLUTE: 0 K/UL (ref 0–0.2)
BASOPHILS RELATIVE PERCENT: 0.7 %
BUN BLDV-MCNC: 5 MG/DL (ref 7–20)
CALCIUM SERPL-MCNC: 8.8 MG/DL (ref 8.3–10.6)
CHLORIDE BLD-SCNC: 96 MMOL/L (ref 99–110)
CO2: 28 MMOL/L (ref 21–32)
CREAT SERPL-MCNC: <0.5 MG/DL (ref 0.6–1.2)
EOSINOPHILS ABSOLUTE: 0.2 K/UL (ref 0–0.6)
EOSINOPHILS RELATIVE PERCENT: 3.3 %
GFR AFRICAN AMERICAN: >60
GFR NON-AFRICAN AMERICAN: >60
GLUCOSE BLD-MCNC: 121 MG/DL (ref 70–99)
GLUCOSE BLD-MCNC: 94 MG/DL (ref 70–99)
GLUCOSE BLD-MCNC: 96 MG/DL (ref 70–99)
HCT VFR BLD CALC: 30 % (ref 36–48)
HEMOGLOBIN: 10.4 G/DL (ref 12–16)
LYMPHOCYTES ABSOLUTE: 1.5 K/UL (ref 1–5.1)
LYMPHOCYTES RELATIVE PERCENT: 23.6 %
MCH RBC QN AUTO: 30.1 PG (ref 26–34)
MCHC RBC AUTO-ENTMCNC: 34.7 G/DL (ref 31–36)
MCV RBC AUTO: 86.8 FL (ref 80–100)
MONOCYTES ABSOLUTE: 0.5 K/UL (ref 0–1.3)
MONOCYTES RELATIVE PERCENT: 8.4 %
NEUTROPHILS ABSOLUTE: 4 K/UL (ref 1.7–7.7)
NEUTROPHILS RELATIVE PERCENT: 64 %
PDW BLD-RTO: 12.9 % (ref 12.4–15.4)
PERFORMED ON: ABNORMAL
PERFORMED ON: NORMAL
PLATELET # BLD: 256 K/UL (ref 135–450)
PMV BLD AUTO: 7.3 FL (ref 5–10.5)
POTASSIUM SERPL-SCNC: 4 MMOL/L (ref 3.5–5.1)
RBC # BLD: 3.46 M/UL (ref 4–5.2)
SODIUM BLD-SCNC: 131 MMOL/L (ref 136–145)
WBC # BLD: 6.3 K/UL (ref 4–11)

## 2020-08-27 PROCEDURE — 80048 BASIC METABOLIC PNL TOTAL CA: CPT

## 2020-08-27 PROCEDURE — 96372 THER/PROPH/DIAG INJ SC/IM: CPT

## 2020-08-27 PROCEDURE — 97530 THERAPEUTIC ACTIVITIES: CPT

## 2020-08-27 PROCEDURE — 85025 COMPLETE CBC W/AUTO DIFF WBC: CPT

## 2020-08-27 PROCEDURE — 6370000000 HC RX 637 (ALT 250 FOR IP): Performed by: NURSE PRACTITIONER

## 2020-08-27 PROCEDURE — G0378 HOSPITAL OBSERVATION PER HR: HCPCS

## 2020-08-27 PROCEDURE — 6360000002 HC RX W HCPCS: Performed by: INTERNAL MEDICINE

## 2020-08-27 PROCEDURE — 6370000000 HC RX 637 (ALT 250 FOR IP): Performed by: INTERNAL MEDICINE

## 2020-08-27 PROCEDURE — 2580000003 HC RX 258: Performed by: INTERNAL MEDICINE

## 2020-08-27 RX ADMIN — OXYCODONE HYDROCHLORIDE AND ACETAMINOPHEN 1 TABLET: 5; 325 TABLET ORAL at 11:34

## 2020-08-27 RX ADMIN — ENOXAPARIN SODIUM 40 MG: 40 INJECTION SUBCUTANEOUS at 08:28

## 2020-08-27 RX ADMIN — CYCLOBENZAPRINE 10 MG: 10 TABLET, FILM COATED ORAL at 08:26

## 2020-08-27 RX ADMIN — VERAPAMIL HYDROCHLORIDE 120 MG: 80 TABLET, FILM COATED ORAL at 08:27

## 2020-08-27 RX ADMIN — SODIUM CHLORIDE: 9 INJECTION, SOLUTION INTRAVENOUS at 06:57

## 2020-08-27 RX ADMIN — OXYCODONE HYDROCHLORIDE AND ACETAMINOPHEN 1 TABLET: 5; 325 TABLET ORAL at 04:44

## 2020-08-27 RX ADMIN — FERROUS SULFATE TAB 325 MG (65 MG ELEMENTAL FE) 325 MG: 325 (65 FE) TAB at 08:27

## 2020-08-27 RX ADMIN — METFORMIN HYDROCHLORIDE 500 MG: 500 TABLET ORAL at 08:27

## 2020-08-27 RX ADMIN — ATENOLOL 100 MG: 50 TABLET ORAL at 08:27

## 2020-08-27 RX ADMIN — CYCLOBENZAPRINE 10 MG: 10 TABLET, FILM COATED ORAL at 00:52

## 2020-08-27 ASSESSMENT — PAIN DESCRIPTION - LOCATION
LOCATION: BACK

## 2020-08-27 ASSESSMENT — PAIN SCALES - GENERAL
PAINLEVEL_OUTOF10: 7
PAINLEVEL_OUTOF10: 5
PAINLEVEL_OUTOF10: 6
PAINLEVEL_OUTOF10: 5
PAINLEVEL_OUTOF10: 6

## 2020-08-27 ASSESSMENT — PAIN DESCRIPTION - DESCRIPTORS
DESCRIPTORS: ACHING

## 2020-08-27 ASSESSMENT — PAIN DESCRIPTION - ORIENTATION
ORIENTATION: RIGHT;LEFT;LOWER

## 2020-08-27 ASSESSMENT — PAIN DESCRIPTION - PAIN TYPE
TYPE: ACUTE PAIN

## 2020-08-27 NOTE — DISCHARGE SUMMARY
[facility] -- Physician Discharge Summary     Bob Layton  1950  MRN: [id number]    Admit Date: 8/23/2020  Discharge Date: No discharge date for patient encounter. Attending MD: Panchito Dumont MD  Discharging MD: Panchito Dumont MD  PCP: Светлана Dowell MD [place] [id number] / [facility]. [facility] [phone]    Admission Diagnosis: Hyponatremia [E87.1]  Hyponatremia [E87.1]  DISCHARGE DIAGNOSIS: same    Full Hospital Problem List:  Active Hospital Problems    Diagnosis Date Noted    High cholesterol [E78.00] 08/24/2020    DM2 (diabetes mellitus, type 2) ([facility] [E11.9] 08/24/2020    Hyponatremia [E87.1] 08/23/2020    Closed T12 fracture ([facility] Renee Ledesma 08/23/2020           Hospital Course:  72 y.o. female resenting with friend. Lawerance Rushing reports Saturday 2 AM in the morning she needed to use the restroom. Walker Faraz will, sat on the side of the bed and stood up to walk to the bathroom.  Lights are out.  It is totally dark.  States she took a few steps felt lightheaded as sometimes occurs and she fell back landing on her buttock.  She was stunned and the wind was knocked out of her.  After about 10 minutes she was able to stand and go to the bathroom.  Since the fall she has had increasing back pain and abdominal pain.  She has no radicular components.  No bowel or bladder dysfunction.  No saddle anesthesia.  She comes out for evaluation of back pain and abdominal pain. In ER, pain is constant, rated 7/10. Worse with movement, better with narcotics. Described as an aching pain     Ct from ER reviewed by self on computer    Impression:         No acute intra-abdominal or intrapelvic abnormality noted. CT thoracic spine reported separately. CT lumbar spine demonstrates superior endplate fracture at J87 with minimal   loss of height and retropulsion.  No significant narrowing of the neural   canal noted.      Subtle findings are limited by underlying osteopenia.       Labs in ER also show electrolyte abnormalities, Low Na and Low K     Pt to be admitted for pain control, treatment of hyponatremia  Pt is noted to be on diuretics as outpt, they are stopped  Sodium is improved at time of discharge, improved from 121 to 131. Pt also noted to have Acute burst type fracture of T12, resulting in mild loss of vertebral body height. As there is not much loss of height, kyphoplasty is not indicated. At family's request, neurosurgery is asked to see pt, their recs:       Mild T12 compression fracture status post a fall. Chronic T10 compression fracture. Recommend TLSO brace for comfort, trial lidocaine patches. Okay for discharge from neurosurgical standpoint. Recommend follow-up x-rays in 2 weeks outpatient DEXA scan. If fracture or worsens and DEXA scan confirms osteoporosis, then may be candidate for total plasty. Pt's pain is controlled with Percocet  She is seen by pt/ot, SNF recommended  She is to transfer to [facility] made during Hospitalization:  [facility] TO ORTHOTIST/PROSTHETIST    Treatment team at time of Discharge: Treatment Team: Attending Provider: Lyndon Lee MD; Consulting Physician: Lyndon Lee MD; Utilization Reviewer: Gregg Taylor RN; Occupational Therapist: Blake Cantu OT; Nursing Instructor: Phill Luna RN; Utilization Reviewer: Sameera Law RN; Consulting Physician: Bella Weiner MD; Registered Nurse: Brissa Askew RN    Imaging Results:  Ct Thoracic Spine Wo Contrast    Result Date: 8/23/2020  EXAMINATION: CT OF THE THORACIC SPINE WITHOUT CONTRAST  8/23/2020 7:31 pm: TECHNIQUE: CT of the thoracic spine was performed without the administration of intravenous contrast. Multiplanar reformatted images are provided for review.  Dose modulation, iterative reconstruction, and/or weight based adjustment of the mA/kV was utilized to reduce the radiation dose to as low as reasonably achievable. COMPARISON: None. HISTORY: ORDERING SYSTEM PROVIDED HISTORY: Fall TECHNOLOGIST PROVIDED HISTORY: Reason for exam:->Fall Reason for Exam: fall Acuity: Acute Type of Exam: Initial FINDINGS: BONES/ALIGNMENT: There is normal alignment of the spine. There is a chronic compression fracture of T10 as well as an acute superior endplate fracture of E69 resulting in mild loss of vertebral body height. There is a minimal amount of retropulsed bone, without significant osseous canal narrowing. No osseous destructive lesion is seen. DEGENERATIVE CHANGES: No gross spinal canal stenosis or bony neural foraminal narrowing of the thoracic spine. SOFT TISSUES: No paraspinal mass is seen. Acute burst type fracture of T12, resulting in mild loss of vertebral body height. Ct Lumbar Spine Wo Contrast    Result Date: 8/23/2020  EXAMINATION: CT OF THE ABDOMEN AND PELVIS WITH CONTRAST; CT OF THE LUMBAR SPINE WITHOUT CONTRAST 8/23/2020 7:32 pm; 8/23/2020 7:31 pm TECHNIQUE: CT of the abdomen and pelvis was performed with the administration of intravenous contrast. Multiplanar reformatted images are provided for review. Dose modulation, iterative reconstruction, and/or weight based adjustment of the mA/kV was utilized to reduce the radiation dose to as low as reasonably achievable.; CT of the lumbar spine was performed without the administration of intravenous contrast. Multiplanar reformatted images are provided for review. Dose modulation, iterative reconstruction, and/or weight based adjustment of the mA/kV was utilized to reduce the radiation dose to as low as reasonably achievable. COMPARISON: None.  HISTORY: ORDERING SYSTEM PROVIDED HISTORY: Progressive abdominal pain following a fall 1930 6 hours ago TECHNOLOGIST PROVIDED HISTORY: Reason for exam:->Progressive abdominal pain following a fall 1930 6 hours ago Additional Contrast?->None Reason for Exam: abd pain after fall Acuity: Acute Type of Exam: Initial; ORDERING SYSTEM PROVIDED HISTORY: Fall TECHNOLOGIST PROVIDED HISTORY: Reason for exam:->Fall Reason for Exam: fall Acuity: Acute Type of Exam: Initial FINDINGS: Lower Chest: There is evidence of old granulomatous disease. No free air noted within the abdomen or pelvis. Organs: Liver, gallbladder, adrenal glands, kidneys, spleen and pancreas are unremarkable in appearance. GI/Bowel: Stomach is unremarkable in appearance. Duodenal diverticulum is noted. No acute abnormality appreciated about the small bowel. There is diverticulosis without evidence of acute diverticulitis. Appendix is visualized and appears normal. Pelvis: Urinary bladder is distended. The uterus and ovaries are unremarkable. No free fluid noted. Peritoneum/Retroperitoneum: The aorta is normal in caliber. No suspicious retroperitoneal adenopathy is noted. Bones/Soft Tissues: Bony pelvis appears intact. CT lumbar spine: Subtle findings are limited by osteopenia. Subtle fracture involving the superior endplate of G70 extending posteriorly noted without significant narrowing of the central canal.  Remainder of the lumbar spine appears intact. No acute intra-abdominal or intrapelvic abnormality noted. CT thoracic spine reported separately. CT lumbar spine demonstrates superior endplate fracture at N78 with minimal loss of height and retropulsion. No significant narrowing of the neural canal noted. Subtle findings are limited by underlying osteopenia. Ct Abdomen Pelvis W Iv Contrast Additional Contrast? None    Result Date: 8/23/2020  EXAMINATION: CT OF THE ABDOMEN AND PELVIS WITH CONTRAST; CT OF THE LUMBAR SPINE WITHOUT CONTRAST 8/23/2020 7:32 pm; 8/23/2020 7:31 pm TECHNIQUE: CT of the abdomen and pelvis was performed with the administration of intravenous contrast. Multiplanar reformatted images are provided for review.  Dose modulation, iterative reconstruction, and/or weight based adjustment of the mA/kV was utilized to reduce the radiation dose to as low as reasonably achievable.; CT of the lumbar spine was performed without the administration of intravenous contrast. Multiplanar reformatted images are provided for review. Dose modulation, iterative reconstruction, and/or weight based adjustment of the mA/kV was utilized to reduce the radiation dose to as low as reasonably achievable. COMPARISON: None. HISTORY: ORDERING SYSTEM PROVIDED HISTORY: Progressive abdominal pain following a fall 1930 6 hours ago TECHNOLOGIST PROVIDED HISTORY: Reason for exam:->Progressive abdominal pain following a fall 1930 6 hours ago Additional Contrast?->None Reason for Exam: abd pain after fall Acuity: Acute Type of Exam: Initial; ORDERING SYSTEM PROVIDED HISTORY: Fall TECHNOLOGIST PROVIDED HISTORY: Reason for exam:->Fall Reason for Exam: fall Acuity: Acute Type of Exam: Initial FINDINGS: Lower Chest: There is evidence of old granulomatous disease. No free air noted within the abdomen or pelvis. Organs: Liver, gallbladder, adrenal glands, kidneys, spleen and pancreas are unremarkable in appearance. GI/Bowel: Stomach is unremarkable in appearance. Duodenal diverticulum is noted. No acute abnormality appreciated about the small bowel. There is diverticulosis without evidence of acute diverticulitis. Appendix is visualized and appears normal. Pelvis: Urinary bladder is distended. The uterus and ovaries are unremarkable. No free fluid noted. Peritoneum/Retroperitoneum: The aorta is normal in caliber. No suspicious retroperitoneal adenopathy is noted. Bones/Soft Tissues: Bony pelvis appears intact. CT lumbar spine: Subtle findings are limited by osteopenia. Subtle fracture involving the superior endplate of M94 extending posteriorly noted without significant narrowing of the central canal.  Remainder of the lumbar spine appears intact. No acute intra-abdominal or intrapelvic abnormality noted.  CT thoracic spine reported separately. CT lumbar spine demonstrates superior endplate fracture at F97 with minimal loss of height and retropulsion. No significant narrowing of the neural canal noted. Subtle findings are limited by underlying osteopenia. Discharge Exam:  /81   Pulse 65   Temp 97.3 °F (36.3 °C) (Oral)   Resp 16   Ht 5' 4\" (1.626 m)   Wt 127 lb 12.8 oz (58 kg)   SpO2 98%   BMI 21.94 kg/m²   General appearance: alert, appears stated age and cooperative  Head: Normocephalic, without obvious abnormality, atraumatic  Lungs: clear to auscultation bilaterally  Heart: regular rate and rhythm, S1, S2 normal, no murmur, click, rub or gallop  Abdomen: soft, non-tender; bowel sounds normal; no masses,  no organomegaly  Extremities: extremities normal, atraumatic, no cyanosis or edema    Disposition: SNF    Condition: stable    Discharge Medications: Joanna Cisneros   Home Medication Instructions WIW:[id number]    Printed on:08/27/20 1650   Medication Information                      atenolol (TENORMIN) 100 MG tablet  Take 1 tablet by mouth daily             atorvastatin (LIPITOR) 10 MG tablet  Take 10 mg by mouth daily             Cyanocobalamin (VITAMIN B 12 PO)  Take 1,000 mcg by mouth daily             cyclobenzaprine (FLEXERIL) 10 MG tablet  Take 1 tablet by mouth 3 times daily as needed for Muscle spasms             ferrous sulfate 325 (65 Fe) MG EC tablet  Take 325 mg by mouth 3 times daily (with meals)             linaclotide (LINZESS) 290 MCG CAPS capsule  Take 1 capsule by mouth every morning (before breakfast)             lubiprostone (AMITIZA) 24 MCG capsule  Take 1 capsule by mouth daily (with breakfast)             metFORMIN (GLUCOPHAGE) 500 MG tablet  Take 1 tablet by mouth 2 times daily (with meals)             oxyCODONE-acetaminophen (PERCOCET) 5-325 MG per tablet  Take 1 tablet by mouth every 6 hours as needed for Pain for up to 3 days.              verapamil (CALAN) 120 MG tablet  Take 1 tablet by mouth 2 times daily                 Allergies:  No Known Allergies    Follow up Instructions: Follow-up with PCP: Hilaria Tinajero MD in 2 wk .       Total time spent on day of discharge including face-to-face visit, examination, documentation, counseling, preparation of discharge plans and followup, and discharge medicine reconciliation and presciptions is 34 minutes    Signed:  Adrianne Gomez MD  8/27/2020

## 2020-08-27 NOTE — PROGRESS NOTES
Physical Therapy  Facility/Department: [address] NURSING  Daily Treatment Note  NAME: Lyric Crowder  : 1950  MRN: [id number]    Date of Service: 2020    Discharge Recommendations:  Lyric Crowder scored a 18/24 on the AM-PAC short mobility form. Current research shows that an AM-PAC score of 17 or less is typically not associated with a discharge to the patient's home setting. Based on the patient's AM-PAC score and their current functional mobility deficits, it is recommended that the patient have 3-5 sessions per week of Physical Therapy at d/c to increase the patient's independence. Please see assessment section for further patient specific details. If patient discharges prior to next session this note will serve as a discharge summary. Please see below for the latest assessment towards goals. 3-5 sessions per week   PT Equipment Recommendations  Equipment Needed: No  Other: Needs RW for safe ambulation currently - borrowing one from friend    Assessment   Body structures, Functions, Activity limitations: Decreased functional mobility ; Decreased strength;Decreased safe awareness;Decreased balance  Assessment: Less pain with TLSO on but still present during transfers and mobility. Slowly progressing with ambulation but still relying on RW. Was able to negotiate stairs with CGA, slowy and with step-to pattern  Treatment Diagnosis: decreased functional mobility, impaired gait, decreased balance  Prognosis: Good  PT Education: Goals;PT Role;Plan of Care;Disease Specific Education  Patient Education: d/c recommendations at length - verbalized understanding  Barriers to Learning: hearing  REQUIRES PT FOLLOW UP: Yes  Activity Tolerance  Activity Tolerance: Patient limited by pain     Patient Diagnosis(es): The primary encounter diagnosis was Hyponatremia.  Diagnoses of Hypokalemia, Hypomagnesemia, Asymptomatic bacteriuria, and Closed stable burst fracture of twelfth thoracic vertebra, initial encounter [facility]) were also pertinent to this visit. has a past medical history of Colon polyps, Constipation, Diabetes mellitus ([address] Hyperlipidemia, and Hypertension. has a past surgical history that includes  section; Upper gastrointestinal endoscopy (N/A, 2019); Upper gastrointestinal endoscopy (N/A, 2019); Colonoscopy (2019); Colonoscopy (N/A, 2019); Upper gastrointestinal endoscopy (N/A, 2020); Upper gastrointestinal endoscopy (N/A, 2020); and Upper gastrointestinal endoscopy (2020). Restrictions  Restrictions/Precautions  Restrictions/Precautions: Fall Risk(high fall risk)  Required Braces or Orthoses?: Yes  Required Braces or Orthoses  Spinal: Thoracic Lumbar Sacral Orthotics(when OOB)  Position Activity Restriction  Other position/activity restrictions: Ning Nam, sustained a T12 compression fracture. Came in due to increasing back pain reports she got dizzy and fell  Subjective   General  Chart Reviewed: Yes  Response To Previous Treatment: Patient with no complaints from previous session.   Family / Caregiver Present: No  Subjective  Subjective: denied pain at rest but worsened with mobility, nsg present with pain medication, rated 6/10  General Comment  Comments: supine in bed at arrival, new order for TLSO when OOB after neurosurgery consult          Orientation     Cognition      Objective   Bed mobility  Supine to Sit: Supervision  Scooting: Supervision  Transfers  Sit to Stand: Supervision(from EOB and commode with grab bar)  Stand to sit: Supervision  Ambulation  Ambulation?: Yes  Ambulation 1  Surface: level tile  Device: Rolling Walker  Other Apparatus: (TLSO)  Assistance: Stand by assistance  Quality of Gait: decreased step length and kay, steady  Gait Deviations: Slow Kay  Distance: 200 ft  Comments: reporting less pain when brace present  Stairs/Curb  Stairs?: Yes  Stairs  # Steps : 10  Stairs Height: 6\"  Rails: Right ascending  Assistance: Contact guard

## 2020-08-27 NOTE — PLAN OF CARE
Problem: Falls - Risk of:  Goal: Will remain free from falls  Description: Will remain free from falls  Outcome: Ongoing  Note: Fall precautions in place, bed alarm on, nonskid foot wear applied, bed in lowest position, and call light within reach. Will continue to monitor. Problem: Pain:  Goal: Pain level will decrease  Description: Pain level will decrease  Outcome: Ongoing  Note: VSS. Pain medications given per MAR. Lidocaine patch. Back brace when OOB. Heating pad on. Will continue to monitor.

## 2020-08-27 NOTE — CARE COORDINATION
Discharge Plan:      Patient discharged to:  FACILITY: [facility]  PHONE: [phone]  FAX: [phone]  SW/DC Planner faxed, [address] and [place] to: [phone]  Narcotic Prescriptions faxed were:  Yes Percocet  5/325mg  RN: Becca loya call report to:     [phone]  Medical Transport with: [facility]   time: 1.00 pm  Family advised of discharge?: Yes  Daughter  Ender Adjutant?:    Yes  All discharge needs met per case management.

## 2020-09-08 ENCOUNTER — HOSPITAL ENCOUNTER (OUTPATIENT)
Dept: GENERAL RADIOLOGY | Age: 70
Discharge: HOME | End: 2020-09-08
Payer: COMMERCIAL

## 2020-09-08 LAB — SODIUM BLD-SCNC: 129 MMOL/L (ref 136–145)

## 2020-09-08 PROCEDURE — 72070 X-RAY EXAM THORAC SPINE 2VWS: CPT

## 2020-09-08 PROCEDURE — 77080 DXA BONE DENSITY AXIAL: CPT

## 2020-09-28 RX ORDER — ATENOLOL AND CHLORTHALIDONE TABLET 100; 25 MG/1; MG/1
1 TABLET ORAL DAILY
COMMUNITY

## 2020-09-28 RX ORDER — POLYETHYLENE GLYCOL 3350 17 G/17G
17 POWDER, FOR SOLUTION ORAL DAILY
COMMUNITY
End: 2020-09-28 | Stop reason: ALTCHOICE

## 2020-09-28 RX ORDER — DOCUSATE SODIUM 100 MG/1
200 CAPSULE, LIQUID FILLED ORAL EVERY OTHER DAY
COMMUNITY

## 2020-09-28 RX ORDER — SENNA AND DOCUSATE SODIUM 50; 8.6 MG/1; MG/1
1 TABLET, FILM COATED ORAL 2 TIMES DAILY
COMMUNITY
End: 2020-09-28 | Stop reason: ALTCHOICE

## 2020-09-28 NOTE — PROGRESS NOTES
Name_______________________________________Printed:____________________  Date and time of surgery____10/6/2020  1030____________________Arrival Time:_0900  FEC_______________   1. The instructions given regarding when and if a patient needs to stop oral intake prior to surgery varies. Follow the specific instructions you were given                  XXXX___Nothing to eat or to drink after Midnight the night before.                             ____Endoscopy patient follow your DRS instructions-generally you will be doing a part of the prep after Midnight                   ____Carbo loading or ERAS instructions will be given to select patients-if you have been given those instructions -please do the following                           The evening before your surgery after dinner before midnight drink 40 ounces of gatorade. If you are diabetic use sugar free. The morning of surgery drink 40 ounces of water. This needs to be finished 3 hours prior to your surgery start time. 2. Take the following pills with a small sip of water on the morning of surgery_________tenoretic__________________________________________                  Do not take blood pressure medications ending in pril or sartan the michael prior to surgery or the morning of surgery_   3. Aspirin, Ibuprofen, Advil, Naproxen, Vitamin E and other Anti-inflammatory products and supplements should be stopped for 5 -7days before surgery or as directed by your physician. 4. Check with your Doctor regarding stopping Plavix, Coumadin,Eliquis, Lovenox,Effient,Pradaxa,Xarelto, Fragmin or other blood thinners and follow their instructions. 5. Do not smoke, and do not drink any alcoholic beverages 24 hours prior to surgery. This includes NA Beer. Refrain from the usage of any recreational drugs. 6. You may brush your teeth and gargle the morning of surgery. DO NOT SWALLOW WATER   7.  You MUST make arrangements for a responsible adult to stay on site while you are here and take you home after your surgery. You will not be allowed to leave alone or drive yourself home. It is strongly suggested someone stay with you the first 24 hrs. Your surgery will be cancelled if you do not have a ride home. 8. A parent/legal guardian must accompany a child scheduled for surgery and plan to stay at the hospital until the child is discharged. Please do not bring other children with you. 9. Please wear simple, loose fitting clothing to the hospital.  Dorice Or not bring valuables (money, credit cards, checkbooks, etc.) Do not wear any makeup (including no eye makeup) or nail polish on your fingers or toes. 10. DO NOT wear any jewelry or piercings on day of surgery. All body piercing jewelry must be removed. 11. If you have ___dentures, they will be removed before going to the OR; we will provide you a container. If you wear ___contact lenses or ___glasses, they will be removed; please bring a case for them. 12. Please see your family doctor/pediatrician for a history & physical and/or concerning medications. Bring any test results/reports from your physician's office. PCP__________________Phone___________H&P Appt. Date________             13 If you  have a Living Will and Durable Power of  for Healthcare, please bring in a copy. 15. Notify your Surgeon if you develop any illness between now and surgery  time, cough, cold, fever, sore throat, nausea, vomiting, etc.  Please notify your surgeon if you experience dizziness, shortness of breath or blurred vision between now & the time of your surgery             15. DO NOT shave your operative site 96 hours prior to surgery. For face & neck surgery, men may use an electric razor 48 hours prior to surgery. 16. Shower the night before or morning of surgery using an antibacterial soap or as you have been instructed.              17. To provide excellent care visitors will be limited to one in the room at any given time. 18.  Please bring picture ID and insurance card. 19.  Visit our web site for additional information:  [email or web address]              20.During flu season no children under the age of 15 are permitted in the hospital for the safety of all patients. 21. If you take a long acting insulin in the evening only  take half of your usual  dose the night  before your procedure              22. If you use a c-pap please bring DOS if staying overnight,             23.For your convenience [facility] has a pharmacy on site to fill your prescriptions. 24. If you use oxygen and have a portable tank please bring it  with you the DOS             25. Bring a complete list of all your medications with name and dose include any supplements. 26. Other__________________________________________   *Please call pre admission testing if you any further questions   [address] [address] [facility]  [phone]   [address] [place] [address]       All above information reviewed with patient in person or by phone. Patient verbalizes understanding. All questions and concerns addressed. Patient/Rep____________________                                                                                                                                    Patient instructed to get their COVID-19 test done as directed by their doctor (5-7 days prior to procedure)  or patient states will get on _10/1/2020  MFF_________. Patient was notified that they need to have an appointment,number to call provided. The day the COVID test is done is considered day one. Instructed to self quarantine after test until DOS. There is a one visitor policy at [facility] for all surgeries and endoscopies. Whether the visitor can stay or will be asked to wait in the car will depend on the current policy and if social distancing can be maintained. The policy is subject to change at any time. Please make sure the visitor has a cell phone that is on,charged and able to accept calls, as this may be the way that the staff communicates with them. Pain management is NO VISITOR policyThe patients ride is expected to remain in the car with a cell phone for communication. If the ride is leaving the hospital grounds please make sure they are back in time for pickup. Have the patient inform the staff on arrival what their rides plans are while the patient is in the facility. At the MAIN there is one visitor allowed. Please note that the visitor policy is subject to change.

## 2020-10-01 ENCOUNTER — OFFICE VISIT (OUTPATIENT)
Dept: PRIMARY CARE CLINIC | Age: 70
End: 2020-10-01
Payer: COMMERCIAL

## 2020-10-01 PROCEDURE — 99211 OFF/OP EST MAY X REQ PHY/QHP: CPT | Performed by: NURSE PRACTITIONER

## 2020-10-01 NOTE — PROGRESS NOTES
Carolee Schrader received a viral test for COVID-19. They were educated on isolation and quarantine as appropriate. For any symptoms, they were directed to seek care from their PCP, given contact information to establish with a doctor, directed to an urgent care or the emergency room.

## 2020-10-01 NOTE — PATIENT INSTRUCTIONS

## 2020-10-02 LAB — SARS-COV-2, NAA: NOT DETECTED

## 2020-10-06 ENCOUNTER — HOSPITAL ENCOUNTER (OUTPATIENT)
Age: 70
Setting detail: OUTPATIENT SURGERY
Discharge: HOME | End: 2020-10-06
Attending: INTERNAL MEDICINE | Admitting: INTERNAL MEDICINE
Payer: COMMERCIAL

## 2020-10-06 ENCOUNTER — ANESTHESIA EVENT (OUTPATIENT)
Dept: ENDOSCOPY | Age: 70
End: 2020-10-06
Payer: COMMERCIAL

## 2020-10-06 ENCOUNTER — ANESTHESIA (OUTPATIENT)
Dept: ENDOSCOPY | Age: 70
End: 2020-10-06
Payer: COMMERCIAL

## 2020-10-06 VITALS
HEART RATE: 74 BPM | BODY MASS INDEX: 20.49 KG/M2 | DIASTOLIC BLOOD PRESSURE: 78 MMHG | TEMPERATURE: 98 F | RESPIRATION RATE: 16 BRPM | WEIGHT: 120 LBS | HEIGHT: 64 IN | SYSTOLIC BLOOD PRESSURE: 152 MMHG | OXYGEN SATURATION: 100 %

## 2020-10-06 VITALS
SYSTOLIC BLOOD PRESSURE: 185 MMHG | OXYGEN SATURATION: 96 % | DIASTOLIC BLOOD PRESSURE: 94 MMHG | RESPIRATION RATE: 13 BRPM

## 2020-10-06 LAB
GLUCOSE BLD-MCNC: 126 MG/DL (ref 70–99)
GLUCOSE BLD-MCNC: 137 MG/DL (ref 70–99)
PERFORMED ON: ABNORMAL
PERFORMED ON: ABNORMAL

## 2020-10-06 PROCEDURE — 3609012400 HC EGD TRANSORAL BIOPSY SINGLE/MULTIPLE: Performed by: INTERNAL MEDICINE

## 2020-10-06 PROCEDURE — 88342 IMHCHEM/IMCYTCHM 1ST ANTB: CPT

## 2020-10-06 PROCEDURE — 88305 TISSUE EXAM BY PATHOLOGIST: CPT

## 2020-10-06 PROCEDURE — 7100000010 HC PHASE II RECOVERY - FIRST 15 MIN: Performed by: INTERNAL MEDICINE

## 2020-10-06 PROCEDURE — 2580000003 HC RX 258: Performed by: INTERNAL MEDICINE

## 2020-10-06 PROCEDURE — 3700000000 HC ANESTHESIA ATTENDED CARE: Performed by: INTERNAL MEDICINE

## 2020-10-06 PROCEDURE — 2500000003 HC RX 250 WO HCPCS: Performed by: NURSE ANESTHETIST, CERTIFIED REGISTERED

## 2020-10-06 PROCEDURE — 2709999900 HC NON-CHARGEABLE SUPPLY: Performed by: INTERNAL MEDICINE

## 2020-10-06 PROCEDURE — C1726 CATH, BAL DIL, NON-VASCULAR: HCPCS | Performed by: INTERNAL MEDICINE

## 2020-10-06 PROCEDURE — 6360000002 HC RX W HCPCS: Performed by: NURSE ANESTHETIST, CERTIFIED REGISTERED

## 2020-10-06 PROCEDURE — 3609017700 HC EGD DILATION GASTRIC/DUODENAL STRICTURE: Performed by: INTERNAL MEDICINE

## 2020-10-06 PROCEDURE — 7100000011 HC PHASE II RECOVERY - ADDTL 15 MIN: Performed by: INTERNAL MEDICINE

## 2020-10-06 RX ORDER — PROPOFOL 10 MG/ML
INJECTION, EMULSION INTRAVENOUS PRN
Status: DISCONTINUED | OUTPATIENT
Start: 2020-10-06 | End: 2020-10-06 | Stop reason: SDUPTHER

## 2020-10-06 RX ORDER — LANOLIN ALCOHOL/MO/W.PET/CERES
1000 CREAM (GRAM) TOPICAL DAILY
COMMUNITY

## 2020-10-06 RX ORDER — LIDOCAINE HYDROCHLORIDE 20 MG/ML
INJECTION, SOLUTION INFILTRATION; PERINEURAL PRN
Status: DISCONTINUED | OUTPATIENT
Start: 2020-10-06 | End: 2020-10-06 | Stop reason: SDUPTHER

## 2020-10-06 RX ORDER — SODIUM CHLORIDE 9 MG/ML
INJECTION, SOLUTION INTRAVENOUS CONTINUOUS
Status: DISCONTINUED | OUTPATIENT
Start: 2020-10-06 | End: 2020-10-06 | Stop reason: HOSPADM

## 2020-10-06 RX ADMIN — PROPOFOL 20 MG: 10 INJECTION, EMULSION INTRAVENOUS at 10:25

## 2020-10-06 RX ADMIN — PROPOFOL 80 MG: 10 INJECTION, EMULSION INTRAVENOUS at 10:17

## 2020-10-06 RX ADMIN — LIDOCAINE HYDROCHLORIDE 100 MG: 20 INJECTION, SOLUTION INFILTRATION; PERINEURAL at 10:17

## 2020-10-06 RX ADMIN — PROPOFOL 20 MG: 10 INJECTION, EMULSION INTRAVENOUS at 10:21

## 2020-10-06 RX ADMIN — SODIUM CHLORIDE: 9 INJECTION, SOLUTION INTRAVENOUS at 09:22

## 2020-10-06 RX ADMIN — SODIUM CHLORIDE: 9 INJECTION, SOLUTION INTRAVENOUS at 10:15

## 2020-10-06 ASSESSMENT — PAIN - FUNCTIONAL ASSESSMENT: PAIN_FUNCTIONAL_ASSESSMENT: 0-10

## 2020-10-06 ASSESSMENT — PAIN SCALES - GENERAL
PAINLEVEL_OUTOF10: 0

## 2020-10-06 ASSESSMENT — LIFESTYLE VARIABLES: SMOKING_STATUS: 0

## 2020-10-06 NOTE — H&P
[address] [place] [address]    Pre-operative History and Physical    Patient: Felice Wolff  : 1950  CSN:     History Obtained From:   Patient or guardian. HISTORY OF PRESENT ILLNESS:    The patient is a 72 y.o. female here for Endoscopy. 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 [place] N/A 2019    COLONOSCOPY POLYPECTOMY SNARE/COLD BIOPSY performed by Lexus Uriostegui MD at [address] N/A 2019    EGD BIOPSY performed by Lexus Uriostegui MD at [address] [facility] N/A 2019    EGD DILATION BALLOON performed by Lexus Uriostegui MD at [address] [place] [address] N/A 2020    EGD WITH BALLOON  DILATION performed by Lexus Uriostegui MD at [address] [place] [address] N/A 2020    EGD BIOPSY performed by Lexus Uriostegui MD at [address] [facility] ENDOSCOPY  2020    ESOPHAGEAL CAPSULE ENDOSCOPY performed by Lexus Uriostegui MD at [address] [facility]     Medications Prior to Admission:   No current facility-administered medications on file prior to encounter.       Current Outpatient Medications on File Prior to Encounter   Medication Sig Dispense Refill    vitamin B-12 (CYANOCOBALAMIN) 1000 MCG tablet Take 1,000 mcg by mouth daily      Cholecalciferol (VITAMIN D3) 25 MCG (1000 UT) CAPS Take by mouth daily      atenolol-chlorthalidone (TENORETIC) 100-25 MG per tablet Take 1 tablet by mouth daily      linaclotide

## 2020-10-06 NOTE — ANESTHESIA PRE PROCEDURE
COLONOSCOPY  1/23/2019    COLONOSCOPY POLYPECTOMY SNARE/COLD BIOPSY performed by Isabell Dutton MD at [address] COLONOSCOPY N/A 12/11/2019    COLONOSCOPY POLYPECTOMY SNARE/COLD BIOPSY performed by Isabell Dutton MD at [address] N/A 1/23/2019    EGD BIOPSY performed by Isabell Dutton MD at [address] N/A 1/23/2019    EGD DILATION BALLOON performed by Isabell Dutton MD at [address] [place] [address] N/A 2/5/2020    EGD WITH BALLOON  DILATION performed by Isabell Dutton MD at [address] [facility] N/A 2/5/2020    EGD BIOPSY performed by Isabell Dutton MD at [address] [facility] ENDOSCOPY  2/5/2020    ESOPHAGEAL CAPSULE [facility] [address] [facility] History:    Social History     Tobacco Use    Smoking status: Never Smoker    Smokeless tobacco: Never Used   Substance Use Topics    Alcohol use: Yes     Alcohol/week: 18.0 standard drinks     Types: 12 Standard drinks or equivalent, 6 Cans of beer per week     Comment: weekly                                Counseling given: Not Answered      Vital Signs (Current):   Vitals:    09/28/20 1316 10/06/20 0907   BP:  (!) 161/78   Pulse:  62   Resp:  16   Temp:  97.4 °F (36.3 °C)   TempSrc:  Temporal   SpO2:  99%   Weight: 120 lb (54.4 kg)    Height: 5' 4\" (1.626 m)                                               BP Readings from Last 3 Encounters:   10/06/20 (!) 161/78   08/27/20 132/67   02/05/20 133/75       NPO Status:                                                                                 BMI:   Wt Readings from Last 3 Encounters:   09/28/20 120 lb (54.4 kg)   08/27/20 127 lb 12.8 oz (58 kg)   02/05/20 124 lb (56.2 kg)     Body mass index is 20.6 kg/m².     CBC:   Lab Results   Component Value Date    WBC 6.3 08/27/2020    RBC 3.46 08/27/2020    HGB 10.4 08/27/2020    HCT 30.0 08/27/2020    MCV 86.8 08/27/2020    RDW 12.9 08/27/2020     08/27/2020       CMP:   Lab Results   Component Value Date     09/08/2020    K 4.0 08/27/2020    K 3.4 08/24/2020    CL 96 08/27/2020    CO2 28 08/27/2020    BUN 5 08/27/2020    CREATININE <0.5 08/27/2020    GFRAA >60 08/27/2020    AGRATIO 1.6 08/24/2020    LABGLOM >60 08/27/2020    GLUCOSE 96 08/27/2020    GLUCOSE 114 01/13/2015    PROT 6.7 08/24/2020    CALCIUM 8.8 08/27/2020    BILITOT 0.7 08/24/2020    BILITOT 0.3 01/13/2015    ALKPHOS 68 08/24/2020    AST 14 08/24/2020    ALT 13 08/24/2020       POC Tests: No results for input(s): POCGLU, POCNA, POCK, POCCL, POCBUN, POCHEMO, POCHCT in the last 72 hours.     Coags:   Lab Results   Component Value Date    PROTIME 11.9 08/23/2020    INR 1.03 08/23/2020       HCG (If Applicable): No results found for: PREGTESTUR, PREGSERUM, HCG, HCGQUANT     ABGs: No results found for: PHART, PO2ART, FZH4ZOE, XPK0EQB, BEART, B4LHHWOB     Type & Screen (If Applicable):  No results found for: LABABO, LABRH    Drug/Infectious Status (If Applicable):  No results found for: HIV, HEPCAB    COVID-19 Screening (If Applicable):   Lab Results   Component Value Date    COVID19 NOT DETECTED 10/01/2020         Anesthesia Evaluation  Patient summary reviewed and Nursing notes reviewed no history of anesthetic complications:   Airway: Mallampati: III  TM distance: >3 FB   Neck ROM: full  Mouth opening: > = 3 FB Dental:    (+) upper dentures and partials      Pulmonary:Negative Pulmonary ROS and normal exam  breath sounds clear to auscultation      (-) COPD, asthma, sleep apnea and not a current smoker                           Cardiovascular:    (+) hypertension:, hyperlipidemia    (-) past MI, CAD, CABG/stent, dysrhythmias,  angina and  CHF    ECG reviewed  Rhythm: regular  Rate: normal           Beta Blocker:  Dose within 24 Hrs         Neuro/Psych:   Negative Neuro/Psych ROS     (-) seizures, TIA and CVA           GI/Hepatic/Renal: Neg GI/Hepatic/Renal ROS       (-) GERD, liver disease and no renal disease       Endo/Other:    (+) Diabetes (currently no med use)Type II DM, , .    (-) hypothyroidism, hyperthyroidism               Abdominal:           Vascular:                                      Anesthesia Plan      MAC     ASA 3       Induction: intravenous. Anesthetic plan and risks discussed with patient. Plan discussed with CRNA.                   Chintan Jackson MD   10/6/2020

## 2020-10-06 NOTE — ANESTHESIA POSTPROCEDURE EVALUATION
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        Anesthesia Post Evaluation    Patient location during evaluation: PACU  Patient participation: complete - patient participated  Level of consciousness: awake and alert  Airway patency: patent  Nausea & Vomiting: no vomiting and no nausea  Complications: no  Cardiovascular status: hemodynamically stable  Respiratory status: acceptable  Hydration status: stable

## 2020-11-03 PROBLEM — E11.9 DM2 (DIABETES MELLITUS, TYPE 2) (HCC): Status: RESOLVED | Noted: 2020-08-24 | Resolved: 2020-11-03

## 2021-04-28 ENCOUNTER — HOSPITAL ENCOUNTER (OUTPATIENT)
Age: 71
Discharge: HOME | End: 2021-04-28
Payer: COMMERCIAL

## 2021-04-28 ENCOUNTER — HOSPITAL ENCOUNTER (OUTPATIENT)
Dept: GENERAL RADIOLOGY | Age: 71
Discharge: HOME | End: 2021-04-28
Payer: COMMERCIAL

## 2021-04-28 DIAGNOSIS — R52 PAIN: ICD-10-CM

## 2021-04-28 PROCEDURE — 72100 X-RAY EXAM L-S SPINE 2/3 VWS: CPT

## 2021-04-28 PROCEDURE — 72072 X-RAY EXAM THORAC SPINE 3VWS: CPT

## 2021-08-17 ENCOUNTER — HOSPITAL ENCOUNTER (EMERGENCY)
Age: 71
Discharge: HOME | End: 2021-08-18
Attending: EMERGENCY MEDICINE
Payer: COMMERCIAL

## 2021-08-17 VITALS
DIASTOLIC BLOOD PRESSURE: 94 MMHG | TEMPERATURE: 97.9 F | OXYGEN SATURATION: 98 % | RESPIRATION RATE: 20 BRPM | HEART RATE: 69 BPM | SYSTOLIC BLOOD PRESSURE: 174 MMHG

## 2021-08-17 DIAGNOSIS — M54.2 NECK PAIN: Primary | ICD-10-CM

## 2021-08-17 PROCEDURE — 99283 EMERGENCY DEPT VISIT LOW MDM: CPT

## 2021-08-17 RX ORDER — VERAPAMIL HYDROCHLORIDE 120 MG/1
120 TABLET, FILM COATED ORAL 3 TIMES DAILY
COMMUNITY

## 2021-08-17 ASSESSMENT — PAIN SCALES - GENERAL: PAINLEVEL_OUTOF10: 7

## 2021-08-18 ENCOUNTER — APPOINTMENT (OUTPATIENT)
Dept: CT IMAGING | Age: 71
End: 2021-08-18
Payer: COMMERCIAL

## 2021-08-18 LAB
ANION GAP SERPL CALCULATED.3IONS-SCNC: 14 MMOL/L (ref 3–16)
BUN BLDV-MCNC: 12 MG/DL (ref 7–20)
CALCIUM SERPL-MCNC: 10.4 MG/DL (ref 8.3–10.6)
CHLORIDE BLD-SCNC: 93 MMOL/L (ref 99–110)
CO2: 27 MMOL/L (ref 21–32)
CREAT SERPL-MCNC: 0.5 MG/DL (ref 0.6–1.2)
EKG ATRIAL RATE: 63 BPM
EKG ATRIAL RATE: 65 BPM
EKG DIAGNOSIS: NORMAL
EKG DIAGNOSIS: NORMAL
EKG P AXIS: -2 DEGREES
EKG P AXIS: 16 DEGREES
EKG P-R INTERVAL: 186 MS
EKG P-R INTERVAL: 194 MS
EKG Q-T INTERVAL: 444 MS
EKG Q-T INTERVAL: 464 MS
EKG QRS DURATION: 74 MS
EKG QRS DURATION: 82 MS
EKG QTC CALCULATION (BAZETT): 461 MS
EKG QTC CALCULATION (BAZETT): 474 MS
EKG R AXIS: -12 DEGREES
EKG R AXIS: -8 DEGREES
EKG T AXIS: -7 DEGREES
EKG T AXIS: -9 DEGREES
EKG VENTRICULAR RATE: 63 BPM
EKG VENTRICULAR RATE: 65 BPM
GFR AFRICAN AMERICAN: >60
GFR NON-AFRICAN AMERICAN: >60
GLUCOSE BLD-MCNC: 195 MG/DL (ref 70–99)
MAGNESIUM: 1.9 MG/DL (ref 1.8–2.4)
POTASSIUM REFLEX MAGNESIUM: 3.4 MMOL/L (ref 3.5–5.1)
SODIUM BLD-SCNC: 134 MMOL/L (ref 136–145)
TROPONIN: <0.01 NG/ML

## 2021-08-18 PROCEDURE — 80048 BASIC METABOLIC PNL TOTAL CA: CPT

## 2021-08-18 PROCEDURE — 83735 ASSAY OF MAGNESIUM: CPT

## 2021-08-18 PROCEDURE — 93010 ELECTROCARDIOGRAM REPORT: CPT | Performed by: INTERNAL MEDICINE

## 2021-08-18 PROCEDURE — 93005 ELECTROCARDIOGRAM TRACING: CPT | Performed by: EMERGENCY MEDICINE

## 2021-08-18 PROCEDURE — 70498 CT ANGIOGRAPHY NECK: CPT

## 2021-08-18 PROCEDURE — 6370000000 HC RX 637 (ALT 250 FOR IP): Performed by: EMERGENCY MEDICINE

## 2021-08-18 PROCEDURE — 36415 COLL VENOUS BLD VENIPUNCTURE: CPT

## 2021-08-18 PROCEDURE — 6360000004 HC RX CONTRAST MEDICATION: Performed by: EMERGENCY MEDICINE

## 2021-08-18 PROCEDURE — 84484 ASSAY OF TROPONIN QUANT: CPT

## 2021-08-18 RX ORDER — IBUPROFEN 400 MG/1
400 TABLET ORAL ONCE
Status: COMPLETED | OUTPATIENT
Start: 2021-08-18 | End: 2021-08-18

## 2021-08-18 RX ORDER — HYDROCODONE BITARTRATE AND ACETAMINOPHEN 5; 325 MG/1; MG/1
1 TABLET ORAL ONCE
Status: COMPLETED | OUTPATIENT
Start: 2021-08-18 | End: 2021-08-18

## 2021-08-18 RX ADMIN — IOPAMIDOL 75 ML: 755 INJECTION, SOLUTION INTRAVENOUS at 01:51

## 2021-08-18 RX ADMIN — IBUPROFEN 400 MG: 400 TABLET, FILM COATED ORAL at 00:49

## 2021-08-18 RX ADMIN — HYDROCODONE BITARTRATE AND ACETAMINOPHEN 1 TABLET: 5; 325 TABLET ORAL at 00:49

## 2021-08-18 ASSESSMENT — ENCOUNTER SYMPTOMS
EYES NEGATIVE: 1
RESPIRATORY NEGATIVE: 1
ALLERGIC/IMMUNOLOGIC NEGATIVE: 1
GASTROINTESTINAL NEGATIVE: 1

## 2021-08-18 ASSESSMENT — PAIN SCALES - GENERAL: PAINLEVEL_OUTOF10: 8

## 2021-08-18 NOTE — ED NOTES
Bed: 06  Expected date:   Expected time:   Means of arrival:   Comments:   Lin Dunn RN  08/18/21 4842

## 2021-08-18 NOTE — ED PROVIDER NOTES
[address] [facility]        Pt Name: Josue Maurer  MRN: [id number]  [place] 1950  Date of evaluation: 8/17/2021  Provider: Halina Mosley MD  PCP: Chanel Sanders MD            [address] [place] [address]       Chief Complaint   Patient presents with    Neck Pain     c/o neck pain that radiates to chin. also states \"numbness\" under chin that started this morning. HISTORY OF PRESENT ILLNESS   (Location/Symptom, Timing/Onset, Context/Setting, Quality, Duration, Modifying Factors, Severity)  Note limiting factors. Josue Maurer is a 80 y.o. female comes in with right-sided neck pain that radiates up toward her chin. She states she can feel it in the back of her head as well. She notes earlier today and took ibuprofen and the pain went away completely. 3 to 4 hours later it came back. She did not take any more ibuprofen because she states she was coming here. She had no dizziness or weakness in extremity and no pain in her chest or shortness of breath or diaphoresis. She never had any history of any cardiac disease. She denies any trauma and the pain is not exacerbated or relieved by anything    Nursing Notes were all reviewed and agreed with or any disagreements were addressed  in the HPI. REVIEW OF SYSTEMS    (2-9 systems for level 4, 10 or more for level 5)     Review of Systems   Constitutional: Negative. HENT: Negative. Eyes: Negative. Respiratory: Negative. Cardiovascular: Negative. Gastrointestinal: Negative. Endocrine: Negative. Genitourinary: Negative. Musculoskeletal: Positive for neck pain. Skin: Negative. Allergic/Immunologic: Negative. Neurological: Negative. Hematological: Negative. Psychiatric/Behavioral: Negative.         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       SURGICAL HISTORY     Past Surgical History:   Procedure Laterality Date     SECTION      COLONOSCOPY  2019    COLONOSCOPY POLYPECTOMY SNARE/COLD BIOPSY performed by Isabell Dutton MD at [facility] COLONOSCOPY N/A 2019    COLONOSCOPY POLYPECTOMY SNARE/COLD BIOPSY performed by Isabell Dutton MD at [place] [address] N/A 2019    EGD BIOPSY performed by Isabell Dutton MD at [place] [address] N/A 2019    EGD DILATION BALLOON performed by Isabell Dutton MD at [place] [address] N/A 2020    EGD WITH BALLOON  DILATION performed by Isabell Dutotn MD at [place] [address] N/A 2020    EGD BIOPSY performed by Isabell Dutton MD at [place] [address]  2020    ESOPHAGEAL CAPSULE ENDOSCOPY performed by Isabell Dutton MD at [place] [address] N/A 10/6/2020    EGD BIOPSY performed by Isabell Dutton MD at [address] 10/6/2020    EGD DILATION BALLOON performed by Isabell Dutton MD at [facility]       Previous Medications    ATENOLOL-CHLORTHALIDONE (TENORETIC) 100-25 MG PER TABLET    Take 1 tablet by mouth daily    CHOLECALCIFEROL (VITAMIN D3) 25 MCG (1000 UT) CAPS    Take by mouth daily    DOCUSATE SODIUM (COLACE) 100 MG CAPSULE    Take 200 mg by mouth every other day    LINACLOTIDE (LINZESS) 290 MCG CAPS CAPSULE    Take 290 mcg by mouth every morning (before breakfast)    VERAPAMIL (CALAN) 120 MG TABLET    Take 120 mg by mouth 3 times daily    VITAMIN B-12 (CYANOCOBALAMIN) 1000 MCG TABLET    Take 1,000 mcg by mouth daily       ALLERGIES     Patient has no known allergies.     FAMILYHISTORY Exam  Vitals and nursing note reviewed. Constitutional:       Appearance: Normal appearance. HENT:      Head: Normocephalic and atraumatic. Right Ear: External ear normal.      Left Ear: External ear normal.      Nose: Nose normal.      Mouth/Throat:      Mouth: Mucous membranes are moist.      Pharynx: No oropharyngeal exudate. Eyes:      Extraocular Movements: Extraocular movements intact. Conjunctiva/sclera: Conjunctivae normal.   Neck:      Vascular: No carotid bruit. Cardiovascular:      Rate and Rhythm: Normal rate and regular rhythm. Pulses: Normal pulses. Heart sounds: Normal heart sounds. Pulmonary:      Effort: Pulmonary effort is normal.      Breath sounds: Normal breath sounds. Abdominal:      General: Bowel sounds are normal.      Tenderness: There is no abdominal tenderness. Musculoskeletal:         General: Normal range of motion. Cervical back: Normal range of motion and neck supple. No rigidity or tenderness. Skin:     General: Skin is warm and dry. Capillary Refill: Capillary refill takes less than 2 seconds. Neurological:      General: No focal deficit present. Mental Status: She is alert and oriented to person, place, and time.    Psychiatric:         Mood and Affect: Mood normal.         Behavior: Behavior normal.         DIAGNOSTIC RESULTS   LABS:    Labs Reviewed   BASIC METABOLIC PANEL W/ REFLEX TO MG FOR LOW K - Abnormal; Notable for the following components:       Result Value    Sodium 134 (*)     Potassium reflex Magnesium 3.4 (*)     Chloride 93 (*)     Glucose 195 (*)     CREATININE 0.5 (*)     All other components within normal limits    Narrative:     Performed at:  [facility] [address], [place], [facility] [employer]   Phone [phone]   TROPONIN    Narrative:     Performed at:  [facility]  [address] [facility], [facility] [employer]   Phone [phone]   MAGNESIUM Narrative:     Performed at:  [facility] [address], [address]   Phone [phone]       All other labs were within normal range or not returned as of this dictation. EKG: Normal sinus rhythm with no ST elevation or depression      RADIOLOGY:        Interpretation per the Radiologist below, if available at the time of this note:    CTA NECK W CONTRAST   Final Result   No acute abnormality in the large arteries of the neck. No focal   hemodynamically significant stenosis, occlusion or dissection. No results found. PROCEDURES   Unless otherwise noted below, none     Procedures    CRITICAL CARE TIME   N/A    CONSULTS:  None      EMERGENCY DEPARTMENT COURSE and DIFFERENTIAL DIAGNOSIS/MDM:   Vitals:    Vitals:    08/17/21 2333   BP: (!) 174/94   Pulse: 69   Resp: 20   Temp: 97.9 °F (36.6 °C)   TempSrc: Oral   SpO2: 98%       Patient was given thefollowing medications:  Medications   ibuprofen (ADVIL;MOTRIN) tablet 400 mg (400 mg Oral Given 8/18/21 0049)   HYDROcodone-acetaminophen (NORCO) 5-325 MG per tablet 1 tablet (1 tablet Oral Given 8/18/21 0049)   iopamidol (ISOVUE-370) 76 % injection 75 mL (75 mLs Intravenous Given 8/18/21 0151)           This patient's work-up is been negative. I do not suspect acute coronary syndrome although I wanted to evaluate for this because of her description of the pain. She did not have any other anginal equivalent symptoms and EKG and troponin are negative. CTA of the neck is negative for any occlusive disease or dissection. She has no focal neurologic deficit. I think that the etiology of her pain is musculoskeletal.  It got better with nonsteroidal anti-inflammatory. She can continue this at home and follow-up with primary care     FINAL IMPRESSION      1.  Neck pain          DISPOSITION/PLAN   DISPOSITION Decision To Discharge 08/18/2021 02:20:03 AM      PATIENT REFERREDTO:  Ho Gutierrez MD  [address] [facility] [place] [address]  [phone]    Call in 1 day        DISCHARGE MEDICATIONS:  New Prescriptions    No medications on file       DISCONTINUED MEDICATIONS:  Discontinued Medications    No medications on file              (Please note that portions ofthis note were completed with a voice recognition program.  Efforts were made to edit the dictations but occasionally words are mis-transcribed.)    Denver Donis MD (electronically signed)              Denver Donis MD  08/18/21 0224

## 2022-07-14 ENCOUNTER — HOSPITAL ENCOUNTER (OUTPATIENT)
Dept: NON INVASIVE DIAGNOSTICS | Age: 72
Discharge: HOME | End: 2022-07-14
Payer: COMMERCIAL

## 2022-07-14 DIAGNOSIS — I49.3 VENTRICULAR PREMATURE BEATS: ICD-10-CM

## 2022-07-14 DIAGNOSIS — R00.2 PALPITATIONS: ICD-10-CM

## 2022-07-14 LAB
LV EF: 58 %
LVEF MODALITY: NORMAL

## 2022-07-14 PROCEDURE — 93306 TTE W/DOPPLER COMPLETE: CPT

## 2022-09-26 ENCOUNTER — APPOINTMENT (OUTPATIENT)
Dept: CT IMAGING | Age: 72
End: 2022-09-26
Payer: COMMERCIAL

## 2022-09-26 ENCOUNTER — HOSPITAL ENCOUNTER (EMERGENCY)
Age: 72
Discharge: HOME | End: 2022-09-26
Attending: EMERGENCY MEDICINE
Payer: COMMERCIAL

## 2022-09-26 VITALS
BODY MASS INDEX: 22.2 KG/M2 | OXYGEN SATURATION: 99 % | RESPIRATION RATE: 15 BRPM | HEIGHT: 64 IN | TEMPERATURE: 98.4 F | HEART RATE: 77 BPM | WEIGHT: 130 LBS | DIASTOLIC BLOOD PRESSURE: 85 MMHG | SYSTOLIC BLOOD PRESSURE: 150 MMHG

## 2022-09-26 DIAGNOSIS — I65.01 STENOSIS OF RIGHT VERTEBRAL ARTERY: ICD-10-CM

## 2022-09-26 DIAGNOSIS — R51.9 NONINTRACTABLE HEADACHE, UNSPECIFIED CHRONICITY PATTERN, UNSPECIFIED HEADACHE TYPE: Primary | ICD-10-CM

## 2022-09-26 DIAGNOSIS — I65.23 BILATERAL CAROTID ARTERY STENOSIS: ICD-10-CM

## 2022-09-26 LAB
A/G RATIO: 1.4 (ref 1.1–2.2)
ALBUMIN SERPL-MCNC: 4.6 G/DL (ref 3.4–5)
ALP BLD-CCNC: 115 U/L (ref 40–129)
ALT SERPL-CCNC: 22 U/L (ref 10–40)
ANION GAP SERPL CALCULATED.3IONS-SCNC: 14 MMOL/L (ref 3–16)
APTT: 28.5 SEC (ref 23–34.3)
AST SERPL-CCNC: 18 U/L (ref 15–37)
BASOPHILS ABSOLUTE: 0 K/UL (ref 0–0.2)
BASOPHILS RELATIVE PERCENT: 0.3 %
BILIRUB SERPL-MCNC: 0.4 MG/DL (ref 0–1)
BUN BLDV-MCNC: 11 MG/DL (ref 7–20)
CALCIUM SERPL-MCNC: 10 MG/DL (ref 8.3–10.6)
CHLORIDE BLD-SCNC: 94 MMOL/L (ref 99–110)
CO2: 24 MMOL/L (ref 21–32)
CREAT SERPL-MCNC: <0.5 MG/DL (ref 0.6–1.2)
EOSINOPHILS ABSOLUTE: 0 K/UL (ref 0–0.6)
EOSINOPHILS RELATIVE PERCENT: 0.5 %
GFR AFRICAN AMERICAN: >60
GFR NON-AFRICAN AMERICAN: >60
GLUCOSE BLD-MCNC: 183 MG/DL (ref 70–99)
HCT VFR BLD CALC: 41.9 % (ref 36–48)
HEMOGLOBIN: 13.8 G/DL (ref 12–16)
INR BLD: 0.94 (ref 0.87–1.14)
LYMPHOCYTES ABSOLUTE: 0.9 K/UL (ref 1–5.1)
LYMPHOCYTES RELATIVE PERCENT: 10.3 %
MCH RBC QN AUTO: 28.5 PG (ref 26–34)
MCHC RBC AUTO-ENTMCNC: 32.8 G/DL (ref 31–36)
MCV RBC AUTO: 86.7 FL (ref 80–100)
MONOCYTES ABSOLUTE: 0.4 K/UL (ref 0–1.3)
MONOCYTES RELATIVE PERCENT: 4.9 %
NEUTROPHILS ABSOLUTE: 7.4 K/UL (ref 1.7–7.7)
NEUTROPHILS RELATIVE PERCENT: 84 %
PDW BLD-RTO: 13.5 % (ref 12.4–15.4)
PLATELET # BLD: 261 K/UL (ref 135–450)
PMV BLD AUTO: 8 FL (ref 5–10.5)
POTASSIUM REFLEX MAGNESIUM: 3.9 MMOL/L (ref 3.5–5.1)
PROTHROMBIN TIME: 12.4 SEC (ref 11.7–14.5)
RBC # BLD: 4.84 M/UL (ref 4–5.2)
SODIUM BLD-SCNC: 132 MMOL/L (ref 136–145)
TOTAL PROTEIN: 7.8 G/DL (ref 6.4–8.2)
WBC # BLD: 8.8 K/UL (ref 4–11)

## 2022-09-26 PROCEDURE — 6360000002 HC RX W HCPCS: Performed by: PHYSICIAN ASSISTANT

## 2022-09-26 PROCEDURE — 96375 TX/PRO/DX INJ NEW DRUG ADDON: CPT

## 2022-09-26 PROCEDURE — 85610 PROTHROMBIN TIME: CPT

## 2022-09-26 PROCEDURE — 2580000003 HC RX 258: Performed by: PHYSICIAN ASSISTANT

## 2022-09-26 PROCEDURE — 6360000002 HC RX W HCPCS: Performed by: EMERGENCY MEDICINE

## 2022-09-26 PROCEDURE — 80053 COMPREHEN METABOLIC PANEL: CPT

## 2022-09-26 PROCEDURE — 99285 EMERGENCY DEPT VISIT HI MDM: CPT

## 2022-09-26 PROCEDURE — 96365 THER/PROPH/DIAG IV INF INIT: CPT

## 2022-09-26 PROCEDURE — 6370000000 HC RX 637 (ALT 250 FOR IP): Performed by: PHYSICIAN ASSISTANT

## 2022-09-26 PROCEDURE — 70496 CT ANGIOGRAPHY HEAD: CPT

## 2022-09-26 PROCEDURE — 6360000004 HC RX CONTRAST MEDICATION: Performed by: PHYSICIAN ASSISTANT

## 2022-09-26 PROCEDURE — 96361 HYDRATE IV INFUSION ADD-ON: CPT

## 2022-09-26 PROCEDURE — 70450 CT HEAD/BRAIN W/O DYE: CPT

## 2022-09-26 PROCEDURE — 85730 THROMBOPLASTIN TIME PARTIAL: CPT

## 2022-09-26 PROCEDURE — 85025 COMPLETE CBC W/AUTO DIFF WBC: CPT

## 2022-09-26 RX ORDER — 0.9 % SODIUM CHLORIDE 0.9 %
500 INTRAVENOUS SOLUTION INTRAVENOUS ONCE
Status: COMPLETED | OUTPATIENT
Start: 2022-09-26 | End: 2022-09-26

## 2022-09-26 RX ORDER — KETOROLAC TROMETHAMINE 30 MG/ML
15 INJECTION, SOLUTION INTRAMUSCULAR; INTRAVENOUS ONCE
Status: COMPLETED | OUTPATIENT
Start: 2022-09-26 | End: 2022-09-26

## 2022-09-26 RX ORDER — METOCLOPRAMIDE HYDROCHLORIDE 5 MG/ML
10 INJECTION INTRAMUSCULAR; INTRAVENOUS ONCE
Status: COMPLETED | OUTPATIENT
Start: 2022-09-26 | End: 2022-09-26

## 2022-09-26 RX ORDER — ACETAMINOPHEN 500 MG
1000 TABLET ORAL ONCE
Status: COMPLETED | OUTPATIENT
Start: 2022-09-26 | End: 2022-09-26

## 2022-09-26 RX ORDER — MAGNESIUM SULFATE 1 G/100ML
1000 INJECTION INTRAVENOUS ONCE
Status: COMPLETED | OUTPATIENT
Start: 2022-09-26 | End: 2022-09-26

## 2022-09-26 RX ORDER — DIPHENHYDRAMINE HYDROCHLORIDE 50 MG/ML
25 INJECTION INTRAMUSCULAR; INTRAVENOUS ONCE
Status: COMPLETED | OUTPATIENT
Start: 2022-09-26 | End: 2022-09-26

## 2022-09-26 RX ADMIN — METOCLOPRAMIDE 10 MG: 5 INJECTION, SOLUTION INTRAMUSCULAR; INTRAVENOUS at 13:32

## 2022-09-26 RX ADMIN — KETOROLAC TROMETHAMINE 15 MG: 30 INJECTION, SOLUTION INTRAMUSCULAR at 15:18

## 2022-09-26 RX ADMIN — ACETAMINOPHEN 1000 MG: 500 TABLET ORAL at 15:17

## 2022-09-26 RX ADMIN — MAGNESIUM SULFATE HEPTAHYDRATE 1000 MG: 1 INJECTION, SOLUTION INTRAVENOUS at 15:27

## 2022-09-26 RX ADMIN — DIPHENHYDRAMINE HYDROCHLORIDE 25 MG: 50 INJECTION INTRAMUSCULAR; INTRAVENOUS at 12:44

## 2022-09-26 RX ADMIN — IOPAMIDOL 75 ML: 755 INJECTION, SOLUTION INTRAVENOUS at 13:07

## 2022-09-26 RX ADMIN — SODIUM CHLORIDE 500 ML: 9 INJECTION, SOLUTION INTRAVENOUS at 12:43

## 2022-09-26 ASSESSMENT — PAIN DESCRIPTION - LOCATION: LOCATION: HEAD

## 2022-09-26 ASSESSMENT — PAIN SCALES - GENERAL
PAINLEVEL_OUTOF10: 5
PAINLEVEL_OUTOF10: 2
PAINLEVEL_OUTOF10: 6
PAINLEVEL_OUTOF10: 8
PAINLEVEL_OUTOF10: 5

## 2022-09-26 ASSESSMENT — PAIN - FUNCTIONAL ASSESSMENT: PAIN_FUNCTIONAL_ASSESSMENT: 0-10

## 2022-09-26 NOTE — ED PROVIDER NOTES
ED Attending Attestation Note    This patient was seen by the advanced practice provider. I personally saw the patient and performed a substantive portion of the visit including all aspects of the medical decision making. Briefly, 71 y.o. female presents with complaint of headache. Focused exam:   Gen: 71 y.o. female, NAD  HEENT: NCAT. PERRL. EOMI. 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 40% stenosis in the intracranial right vertebral artery. 40% stenosis in the cavernous/supraclinoid segments of the bilateral internal   carotid arteries. Otherwise, no acute abnormality or flow-limiting stenosis in the remainder of   the major arteries of the head and neck. CT Head W/O Contrast   Final Result   No acute intracranial abnormality. MDM:   This is a 58-year-old female presenting with complaint of headache. Patient is hypertensive with otherwise reassuring vital signs. Neurological exam is nonfocal.    Noncontrast head CT nothing acute. CT angiography of the head and neck shows no LVO nor aneurysmal disease. Presentation not consistent with intracranial hemorrhage. No fever or nuchal rigidity. Not consistent with meningitis. For further details of the patient's emergency department visit, please see the advanced practice provider's documentation. Regino De Oliveira MD     This report has been produced using speech recognition software and may contain errors related to that system including errors in grammar, punctuation, and spelling, as well as words and phrases that may be inappropriate. If there are any questions or concerns please feel free to contact the dictating provider for clarification.        Regino De lOiveira MD  09/26/22 9433

## 2022-09-26 NOTE — ED NOTES
Pt complaining of pounding headache, pain of 9 (0-10). /90 mmHg, pt family reports family hx of aneurysm.       Ivelisse Castro RN  09/26/22 8443

## 2022-09-26 NOTE — ED PROVIDER NOTES
[facility]        Pt Name: Fartun Cervantes  MRN: [id number]  [place] 1950  Date of evaluation: 9/26/2022  Provider: Tay Durand PA-C  PCP: Boby Lopez MD  Note Started: 12:02 PM EDT        I have seen and evaluated this patient with my supervising physician Marilee Goyal. CHIEF COMPLAINT       Chief Complaint   Patient presents with    Headache     Pain to right side of her head. No known injury to cause pain. Starting to feel nauseas at this time. HISTORY OF PRESENT ILLNESS   (Location, Timing/Onset, Context/Setting, Quality, Duration, Modifying Factors, Severity, Associated Signs and Symptoms)  Note limiting factors. Chief Complaint: Headache, nausea    Fartun Cervantes is a 71 y.o. female who presents to the emergency department with a chief complaint of headache and nausea. States she woke up yesterday with a mild headache that she took ibuprofen for and it went away. Again woke up at around 830 this morning and states that the headache was there and is gradually gotten worse today to about a 9 out of 10. She states she is nauseous with this but denies vomiting. States it goes into her neck and is all over her head now. Her sister had a brain aneurysm but she denies any personal history of this. She is not exerting herself when this happens. She denies difficulty moving her extremities, numbness, fevers, neck stiffness, syncope, chest pain, shortness breath, abdominal pain, urinary or bowel symptoms. Denies any other symptoms. Nursing Notes were all reviewed and agreed with or any disagreements were addressed in the HPI. REVIEW OF SYSTEMS    (2-9 systems for level 4, 10 or more for level 5)     Review of Systems    Positives and Pertinent negatives as per HPI. Except as noted above in the ROS, all other systems were reviewed and negative.        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         SURGICAL HISTORY     Past Surgical History:   Procedure Laterality Date     SECTION      COLONOSCOPY  2019    COLONOSCOPY POLYPECTOMY SNARE/COLD BIOPSY performed by Blanche Oakley MD at [address] N/A 2019    COLONOSCOPY POLYPECTOMY SNARE/COLD BIOPSY performed by Blanche Oakley MD at [address] [facility] N/A 2019    EGD BIOPSY performed by Blanche Oakley MD at [address] [place] [address] N/A 2019    EGD DILATION BALLOON performed by Blanche Oakley MD at [address] [place] [address] N/A 2020    EGD WITH BALLOON  DILATION performed by Blanche Oakley MD at [address] [place] [address] N/A 2020    EGD BIOPSY performed by Blanche Oakley MD at [address] [place] [address]  2020    ESOPHAGEAL CAPSULE ENDOSCOPY performed by Blanche Oakley MD at [address] [place] [address] N/A 10/6/2020    EGD BIOPSY performed by Blanche Oakley MD at [address] [place] [address] N/A 10/6/2020    EGD DILATION BALLOON performed by Blanche Oakley MD at [address]       Discharge Medication List as of 2022  4:10 PM        CONTINUE these medications which have NOT CHANGED    Details   verapamil (CALAN) 120 MG tablet Take 120 mg by mouth 3 times dailyHistorical Med      vitamin B-12 (CYANOCOBALAMIN) 1000 MCG tablet Take 1,000 mcg by mouth dailyHistorical Med      Cholecalciferol (VITAMIN D3) 25 MCG (1000 UT) CAPS Take by mouth dailyHistorical Med      atenolol-chlorthalidone (TENORETIC) 100-25 MG per tablet Take 1 tablet by mouth dailyHistorical Med      linaclotide (LINZESS) 290 MCG CAPS capsule Take 290 mcg by mouth every morning (before breakfast)Historical Med      docusate sodium (COLACE) 100 MG capsule Take 200 mg by mouth every other dayHistorical Med               ALLERGIES     Patient has no known allergies. FAMILYHISTORY       Family History   Problem Relation Age of Onset    Cancer Mother     Asthma Father     Heart Attack Brother           SOCIAL HISTORY       Social History     Tobacco Use    Smoking status: Never    Smokeless tobacco: Never   Vaping Use    Vaping Use: Never used   Substance Use Topics    Alcohol use: Yes     Alcohol/week: 18.0 standard drinks     Types: 12 Standard drinks or equivalent, 6 Cans of beer per week     Comment: weekly    Drug use: No       SCREENINGS    Walker Coma Scale  Eye Opening: Spontaneous  Best Verbal Response: Oriented  Best Motor Response: Obeys commands  Walker Coma Scale Score: 15        PHYSICAL EXAM    (up to 7 for level 4, 8 or more for level 5)     ED Triage Vitals [09/26/22 1127]   BP Temp Temp Source Heart Rate Resp SpO2 Height Weight   (!) 193/91 98.4 °F (36.9 °C) Oral 81 16 97 % 5' 4\" (1.626 m) 130 lb (59 kg)       Physical Exam  Vitals and nursing note reviewed. Constitutional:       Appearance: She is well-developed. She is not diaphoretic. HENT:      Head: Atraumatic. Nose: Nose normal.   Eyes:      General:         Right eye: No discharge. Left eye: No discharge. Extraocular Movements: Extraocular movements intact. Pupils: Pupils are equal, round, and reactive to light. Cardiovascular:      Rate and Rhythm: Normal rate and regular rhythm. Heart sounds: No murmur heard. No friction rub. No gallop. Pulmonary:      Effort: Pulmonary effort is normal. No respiratory distress. Breath sounds: No stridor. No wheezing, rhonchi or rales. Abdominal:      General: Bowel sounds are normal. There is no distension. Palpations: Abdomen is soft. There is no mass. flow-limiting stenosis in the remainder of   the major arteries of the head and neck. CT Head W/O Contrast   Final Result   No acute intracranial abnormality. No results found. PROCEDURES   Unless otherwise noted below, none     Procedures    CRITICAL CARE TIME       CONSULTS:  None      EMERGENCY DEPARTMENT COURSE and DIFFERENTIAL DIAGNOSIS/MDM:   Vitals:    Vitals:    09/26/22 1548 09/26/22 1556 09/26/22 1618 09/26/22 1633   BP: (!) 162/68 (!) 149/76 (!) 154/83 (!) 150/85   Pulse: 79 79 78 77   Resp: 14 18 13 15   Temp:       TempSrc:       SpO2: 99% 97%  99%   Weight:       Height:           Patient was given the following medications:  Medications   metoclopramide (REGLAN) injection 10 mg (10 mg IntraVENous Given 9/26/22 1332)   diphenhydrAMINE (BENADRYL) injection 25 mg (25 mg IntraVENous Given 9/26/22 1244)   0.9 % sodium chloride bolus (0 mLs IntraVENous Stopped 9/26/22 1633)   iopamidol (ISOVUE-370) 76 % injection 75 mL (75 mLs IntraVENous Given 9/26/22 1307)   magnesium sulfate 1000 mg in dextrose 5% 100 mL IVPB (0 mg IntraVENous Stopped 9/26/22 1633)   ketorolac (TORADOL) injection 15 mg (15 mg IntraVENous Given 9/26/22 1518)   acetaminophen (TYLENOL) tablet 1,000 mg (1,000 mg Oral Given 9/26/22 1517)         Is this patient to be included in the SEP-1 Core Measure due to severe sepsis or septic shock? No   Exclusion criteria - the patient is NOT to be included for SEP-1 Core Measure due to: Infection is not suspected    Patient presented with headache. This began yesterday and was mild and went away after taking Advil. Began again when she woke up today and gradually got worse. She is concerned as her sister has history of intracranial aneurysm. She has had no syncopal episode, neck stiffness. Hemoglobin is normal.  She is grossly neurovascular intact. This gradually got worse. CT and CTA revealed no acute abnormalities.   She was educated on the carotid artery stenosis and vertebral artery stenosis of 40%. She is feeling better after medication here and only rates the pain a 3 out of 10. Low suspicion for subarachnoid hemorrhage, meningitis, encephalitis, thrombosis, mass lesion, CVA, TIA or other emergent etiology. Do not believe lumbar puncture is warranted. She will follow-up as an outpatient. Educated on symptomatic treatment at home. Return here for any worsening of symptoms or problems at home. FINAL IMPRESSION      1. Nonintractable headache, unspecified chronicity pattern, unspecified headache type    2. Bilateral carotid artery stenosis    3.  Stenosis of right vertebral artery          DISPOSITION/PLAN   DISPOSITION Decision To Discharge 09/26/2022 03:59:42 PM      PATIENT REFERRED TO:  Bj Rangel MD  [address] [place] [address]  [phone]    Schedule an appointment as soon as possible for a visit in 3 days  For re-check    [facility] Emergency Department  [address] [place] [address]  [phone]    As needed    DISCHARGE MEDICATIONS:  Discharge Medication List as of 9/26/2022  4:10 PM          DISCONTINUED MEDICATIONS:  Discharge Medication List as of 9/26/2022  4:10 PM                 (Please note that portions of this note were completed with a voice recognition program.  Efforts were made to edit the dictations but occasionally words are mis-transcribed.)    Ganesh Ross PA-C (electronically signed)            Ganesh Ross PA-C  09/26/22 4064

## 2022-10-12 ENCOUNTER — HOSPITAL ENCOUNTER (OUTPATIENT)
Dept: WOMENS IMAGING | Age: 72
Discharge: HOME | End: 2022-10-12
Payer: MEDICARE

## 2022-10-12 ENCOUNTER — HOSPITAL ENCOUNTER (OUTPATIENT)
Dept: GENERAL RADIOLOGY | Age: 72
Discharge: HOME | End: 2022-10-12
Payer: MEDICARE

## 2022-10-12 ENCOUNTER — HOSPITAL ENCOUNTER (OUTPATIENT)
Age: 72
Discharge: HOME | End: 2022-10-12
Payer: MEDICARE

## 2022-10-12 DIAGNOSIS — N95.1 MENOPAUSAL AND FEMALE CLIMACTERIC STATES: ICD-10-CM

## 2022-10-12 DIAGNOSIS — Z12.31 BREAST CANCER SCREENING BY MAMMOGRAM: ICD-10-CM

## 2022-10-12 LAB
CALCIUM IONIZED: 1.22 MMOL/L (ref 1.12–1.32)
CALCIUM SERPL-MCNC: 10.6 MG/DL (ref 8.3–10.6)
PARATHYROID HORMONE INTACT: 22.8 PG/ML (ref 14–72)
PH VENOUS: 7.4 (ref 7.35–7.45)

## 2022-10-12 PROCEDURE — 36415 COLL VENOUS BLD VENIPUNCTURE: CPT

## 2022-10-12 PROCEDURE — 77080 DXA BONE DENSITY AXIAL: CPT

## 2022-10-12 PROCEDURE — 82310 ASSAY OF CALCIUM: CPT

## 2022-10-12 PROCEDURE — 82330 ASSAY OF CALCIUM: CPT

## 2022-10-12 PROCEDURE — 77063 BREAST TOMOSYNTHESIS BI: CPT

## 2022-10-12 PROCEDURE — 83970 ASSAY OF PARATHORMONE: CPT

## 2022-10-24 ENCOUNTER — HOSPITAL ENCOUNTER (OUTPATIENT)
Dept: MRI IMAGING | Age: 72
Discharge: HOME | End: 2022-10-24
Payer: MEDICARE

## 2022-10-24 DIAGNOSIS — G44.89 OTHER HEADACHE SYNDROME: ICD-10-CM

## 2022-10-24 PROCEDURE — 70551 MRI BRAIN STEM W/O DYE: CPT

## 2024-08-02 ENCOUNTER — HOSPITAL ENCOUNTER (OUTPATIENT)
Age: 74
Discharge: HOME | End: 2024-08-02

## 2024-08-07 LAB — MISCELLANEOUS LAB TEST ORDER: NORMAL

## 2024-08-23 LAB — MISCELLANEOUS LAB TEST RESULT: NORMAL

## (undated) DEVICE — ESOPHAGEAL BALLOON DILATATION CATHETER: Brand: CRE FIXED WIRE

## (undated) DEVICE — ESOPHAGEAL/PYLORIC/BILIARY WIREGUIDED BALLOON DILATATION CATHETER: Brand: CRE™ PRO

## (undated) DEVICE — BW-412T DISP COMBO CLEANING BRUSH: Brand: SINGLE USE COMBINATION CLEANING BRUSH

## (undated) DEVICE — SB 3 CAPSULE, 5-PACK: Brand: PILLCAM

## (undated) DEVICE — SYRINGE INFL 60ML DISP ALLIANCE II

## (undated) DEVICE — FORCEPS BX L240CM WRK CHN 2.8MM STD CAP W/ NDL MIC MESH

## (undated) DEVICE — SNARE ENDOSCP L240CM SHTH DIA24MM LOOP W10MM POLYP RND REINF

## (undated) DEVICE — SOLUTION IV IRRIG WATER 500ML POUR BRL ST 2F7113

## (undated) DEVICE — TRAP SPEC RETRV CLR PLAS POLYP IN LN SUCT QUIK CTCH

## (undated) DEVICE — SET VLV 3 PC AWS DISPOSABLE GRDIAN SCOPEVALET

## (undated) DEVICE — BRAVO CF CAPSULE  DELIVERY DEV, 5-PK: Brand: BRAVO

## (undated) DEVICE — PROCEDURE KIT ENDOSCP CUST

## (undated) DEVICE — YANKAUER SUCTION INSTRUMENT NO CONTROL VENT, BULB TIP, CLEAR: Brand: YANKAUER

## (undated) DEVICE — DILATOR ENDOSCP L180CM DIA6FR BLLN L8CM DIA54-60FR

## (undated) DEVICE — MEDI-VAC NON-CONDUCTIVE SUCTION TUBING: Brand: CARDINAL HEALTH

## (undated) DEVICE — MOUTHPIECE ENDOSCP L CTRL OPN AND SIDE PORTS DISP

## (undated) DEVICE — Device: Brand: DISPOSABLE ELECTROSURGICAL SNARE